# Patient Record
Sex: FEMALE | Race: WHITE | NOT HISPANIC OR LATINO | Employment: OTHER | URBAN - METROPOLITAN AREA
[De-identification: names, ages, dates, MRNs, and addresses within clinical notes are randomized per-mention and may not be internally consistent; named-entity substitution may affect disease eponyms.]

---

## 2023-04-16 DIAGNOSIS — R42 VERTIGO: Primary | ICD-10-CM

## 2023-04-16 RX ORDER — MECLIZINE HCL 12.5 MG 12.5 MG/1
12.5 TABLET ORAL 3 TIMES DAILY PRN
Qty: 30 TABLET | Refills: 11 | Status: SHIPPED | OUTPATIENT
Start: 2023-04-16 | End: 2023-09-03 | Stop reason: ALTCHOICE

## 2023-04-16 NOTE — PROGRESS NOTES
Pt called in after hours over the weekend. Mild vertigo with nausea and vomiting. Requesting a prescription strength medication.    Will call in a supply of antivert. Counselling and warning signs reviewed with patient.

## 2023-05-12 LAB
ALBUMIN (G/DL) IN SER/PLAS: 4.1 G/DL (ref 3.4–5)
ALBUMIN (MG/L) IN URINE: NORMAL
ALBUMIN/CREATININE (UG/MG) IN URINE: NORMAL
ANION GAP IN SER/PLAS: 14 MMOL/L (ref 10–20)
CALCIUM (MG/DL) IN SER/PLAS: 10 MG/DL (ref 8.6–10.6)
CARBON DIOXIDE, TOTAL (MMOL/L) IN SER/PLAS: 29 MMOL/L (ref 21–32)
CHLORIDE (MMOL/L) IN SER/PLAS: 105 MMOL/L (ref 98–107)
CREATININE (MG/DL) IN SER/PLAS: 1.35 MG/DL (ref 0.5–1.05)
CREATININE (MG/DL) IN URINE: NORMAL
CREATININE (MG/DL) IN URINE: NORMAL
ERYTHROCYTE DISTRIBUTION WIDTH (RATIO) BY AUTOMATED COUNT: 14.2 % (ref 11.5–14.5)
ERYTHROCYTE MEAN CORPUSCULAR HEMOGLOBIN CONCENTRATION (G/DL) BY AUTOMATED: 31.8 G/DL (ref 32–36)
ERYTHROCYTE MEAN CORPUSCULAR VOLUME (FL) BY AUTOMATED COUNT: 96 FL (ref 80–100)
ERYTHROCYTES (10*6/UL) IN BLOOD BY AUTOMATED COUNT: 4.25 X10E12/L (ref 4–5.2)
GFR FEMALE: 44 ML/MIN/1.73M2
GLUCOSE (MG/DL) IN SER/PLAS: 176 MG/DL (ref 74–99)
HEMATOCRIT (%) IN BLOOD BY AUTOMATED COUNT: 40.6 % (ref 36–46)
HEMOGLOBIN (G/DL) IN BLOOD: 12.9 G/DL (ref 12–16)
LEUKOCYTES (10*3/UL) IN BLOOD BY AUTOMATED COUNT: 10 X10E9/L (ref 4.4–11.3)
NRBC (PER 100 WBCS) BY AUTOMATED COUNT: 0 /100 WBC (ref 0–0)
PARATHYRIN INTACT (PG/ML) IN SER/PLAS: 72.8 PG/ML (ref 18.5–88)
PHOSPHATE (MG/DL) IN SER/PLAS: 3.1 MG/DL (ref 2.5–4.9)
PLATELETS (10*3/UL) IN BLOOD AUTOMATED COUNT: 175 X10E9/L (ref 150–450)
POTASSIUM (MMOL/L) IN SER/PLAS: 3.5 MMOL/L (ref 3.5–5.3)
PROTEIN (MG/DL) IN URINE: NORMAL
PROTEIN/CREATININE (MG/MG) IN URINE: NORMAL
SODIUM (MMOL/L) IN SER/PLAS: 144 MMOL/L (ref 136–145)
UREA NITROGEN (MG/DL) IN SER/PLAS: 17 MG/DL (ref 6–23)

## 2023-09-03 PROBLEM — K59.00 CONSTIPATION: Status: ACTIVE | Noted: 2023-09-03

## 2023-09-03 PROBLEM — M25.562 BILATERAL KNEE PAIN: Status: ACTIVE | Noted: 2023-09-03

## 2023-09-03 PROBLEM — E10.9 TYPE 1 DIABETES MELLITUS (MULTI): Status: ACTIVE | Noted: 2023-09-03

## 2023-09-03 PROBLEM — I63.9: Status: ACTIVE | Noted: 2023-09-03

## 2023-09-03 PROBLEM — N28.9 ABNORMAL RENAL FUNCTION: Status: ACTIVE | Noted: 2023-09-03

## 2023-09-03 PROBLEM — M54.81 OCCIPITAL NEURALGIA OF RIGHT SIDE: Status: ACTIVE | Noted: 2023-09-03

## 2023-09-03 PROBLEM — N39.0 URINARY TRACT INFECTION: Status: ACTIVE | Noted: 2023-09-03

## 2023-09-03 PROBLEM — G45.9 TIA (TRANSIENT ISCHEMIC ATTACK): Status: ACTIVE | Noted: 2023-09-03

## 2023-09-03 PROBLEM — M54.16 BILATERAL LUMBAR RADICULOPATHY: Status: ACTIVE | Noted: 2023-09-03

## 2023-09-03 PROBLEM — L71.9 ROSACEA, UNSPECIFIED: Status: ACTIVE | Noted: 2022-01-12

## 2023-09-03 PROBLEM — R00.2 PALPITATIONS: Status: ACTIVE | Noted: 2023-09-03

## 2023-09-03 PROBLEM — E78.5 HYPERLIPIDEMIA: Status: ACTIVE | Noted: 2023-09-03

## 2023-09-03 PROBLEM — R06.09 DOE (DYSPNEA ON EXERTION): Status: ACTIVE | Noted: 2023-09-03

## 2023-09-03 PROBLEM — F41.9 ANXIETY: Status: ACTIVE | Noted: 2023-09-03

## 2023-09-03 PROBLEM — F33.0 MAJOR DEPRESSIVE DISORDER, RECURRENT EPISODE, MILD DEGREE (CMS-HCC): Status: ACTIVE | Noted: 2023-09-03

## 2023-09-03 PROBLEM — M25.561 BILATERAL KNEE PAIN: Status: ACTIVE | Noted: 2023-09-03

## 2023-09-03 PROBLEM — R51.9 HEADACHE, CHRONIC DAILY: Status: ACTIVE | Noted: 2023-09-03

## 2023-09-03 PROBLEM — R60.9 EDEMA: Status: ACTIVE | Noted: 2023-09-03

## 2023-09-03 PROBLEM — Z94.0 KIDNEY REPLACED BY TRANSPLANT (HHS-HCC): Status: ACTIVE | Noted: 2023-09-03

## 2023-09-03 PROBLEM — E55.9 VITAMIN D DEFICIENCY: Status: ACTIVE | Noted: 2023-09-03

## 2023-09-03 PROBLEM — G43.911 INTRACTABLE MIGRAINE WITH STATUS MIGRAINOSUS: Status: ACTIVE | Noted: 2023-09-03

## 2023-09-03 PROBLEM — M16.0 PRIMARY LOCALIZED OSTEOARTHRITIS OF BOTH HIPS: Status: ACTIVE | Noted: 2023-09-03

## 2023-09-03 PROBLEM — E87.6 HYPOKALEMIA: Status: ACTIVE | Noted: 2023-09-03

## 2023-09-03 PROBLEM — I10 HYPERTENSION: Status: ACTIVE | Noted: 2023-09-03

## 2023-09-03 PROBLEM — E13.44: Status: ACTIVE | Noted: 2023-09-03

## 2023-09-03 RX ORDER — INSULIN LISPRO 100 [IU]/ML
10 INJECTION, SOLUTION INTRAVENOUS; SUBCUTANEOUS
COMMUNITY
Start: 2023-08-31

## 2023-09-03 RX ORDER — OMEPRAZOLE 20 MG/1
20 CAPSULE, DELAYED RELEASE ORAL
COMMUNITY
End: 2023-09-07 | Stop reason: ALTCHOICE

## 2023-09-03 RX ORDER — PROPRANOLOL HYDROCHLORIDE 20 MG/1
20 TABLET ORAL DAILY
COMMUNITY

## 2023-09-03 RX ORDER — BUPROPION HYDROCHLORIDE 300 MG/1
1 TABLET ORAL DAILY
COMMUNITY
Start: 2020-06-09 | End: 2024-01-02

## 2023-09-03 RX ORDER — POLYETHYLENE GLYCOL 3350 17 G/17G
17 POWDER, FOR SOLUTION ORAL
COMMUNITY

## 2023-09-03 RX ORDER — BUPROPION HYDROCHLORIDE 150 MG/1
150 TABLET ORAL
COMMUNITY
Start: 2022-11-14

## 2023-09-03 RX ORDER — PANTOPRAZOLE SODIUM 40 MG/1
40 TABLET, DELAYED RELEASE ORAL DAILY
COMMUNITY
Start: 2015-06-01

## 2023-09-03 RX ORDER — FUROSEMIDE 40 MG/1
1 TABLET ORAL DAILY
COMMUNITY
Start: 2022-02-14

## 2023-09-03 RX ORDER — ASPIRIN 81 MG/1
81 TABLET ORAL 2 TIMES DAILY
COMMUNITY
Start: 2016-08-15

## 2023-09-03 RX ORDER — ADHESIVE BANDAGE
30 BANDAGE TOPICAL
COMMUNITY
End: 2023-09-07 | Stop reason: ALTCHOICE

## 2023-09-03 RX ORDER — AMOXICILLIN 250 MG
2 CAPSULE ORAL 2 TIMES DAILY
COMMUNITY
Start: 2022-01-31

## 2023-09-03 RX ORDER — CLONAZEPAM 1 MG/1
1 TABLET ORAL DAILY
COMMUNITY

## 2023-09-03 RX ORDER — CALCIUM CARBONATE/VITAMIN D3 600MG-5MCG
1 TABLET ORAL DAILY
COMMUNITY
Start: 2015-06-01 | End: 2023-09-07 | Stop reason: ALTCHOICE

## 2023-09-03 RX ORDER — ROSUVASTATIN CALCIUM 20 MG/1
20 TABLET, COATED ORAL DAILY
COMMUNITY
Start: 2023-07-07

## 2023-09-03 RX ORDER — ELECTROLYTES/DEXTROSE
1 SOLUTION, ORAL ORAL DAILY
COMMUNITY
Start: 2015-06-01

## 2023-09-03 RX ORDER — GABAPENTIN 100 MG/1
200 CAPSULE ORAL 2 TIMES DAILY PRN
COMMUNITY

## 2023-09-03 RX ORDER — POTASSIUM CHLORIDE 1500 MG/1
10 TABLET, EXTENDED RELEASE ORAL DAILY
COMMUNITY
Start: 2016-03-17

## 2023-09-03 RX ORDER — SERTRALINE HYDROCHLORIDE 100 MG/1
2 TABLET, FILM COATED ORAL DAILY
COMMUNITY
Start: 2020-10-27 | End: 2024-03-09

## 2023-09-03 RX ORDER — BUTYROSPERMUM PARKII(SHEA BUTTER), SIMMONDSIA CHINENSIS (JOJOBA) SEED OIL, ALOE BARBADENSIS LEAF EXTRACT .01; 1; 3.5 G/100G; G/100G; G/100G
250 LIQUID TOPICAL 2 TIMES DAILY
COMMUNITY

## 2023-09-03 RX ORDER — LOSARTAN POTASSIUM 25 MG/1
0.5 TABLET ORAL DAILY
COMMUNITY
Start: 2022-09-26 | End: 2023-09-07 | Stop reason: ALTCHOICE

## 2023-09-03 RX ORDER — L. ACIDOPHILUS/PECTIN, CITRUS 25MM-100MG
1 TABLET ORAL DAILY
COMMUNITY

## 2023-09-03 RX ORDER — INSULIN GLARGINE 300 U/ML
20 INJECTION, SOLUTION SUBCUTANEOUS NIGHTLY
COMMUNITY
Start: 2022-05-24

## 2023-09-03 RX ORDER — ATORVASTATIN CALCIUM 40 MG/1
40 TABLET, FILM COATED ORAL DAILY
COMMUNITY
End: 2023-09-07 | Stop reason: ALTCHOICE

## 2023-09-04 NOTE — PROGRESS NOTES
Subjective   Patient ID: Yin Ojeda is a 63 y.o. female who presents for follow-up regarding diabetes    HPI     Review of Systems    Objective   There were no vitals taken for this visit.    Physical Exam    Assessment/Plan   {Assess/PlanSmartLinks:48623}

## 2023-09-06 ENCOUNTER — APPOINTMENT (OUTPATIENT)
Dept: PRIMARY CARE | Facility: CLINIC | Age: 63
End: 2023-09-06
Payer: MEDICARE

## 2023-09-06 NOTE — PROGRESS NOTES
Subjective   Patient ID: Yin Ojeda is a 63 y.o. female who presents for diabetes follow-up    HPI   The patient reports a history of intermittent episodes of a needle sensation over the plantar surfaces of both heels over the past several months.  She reports no precipitating, exacerbating, relieving factors.  She reports no associated weakness in the feet.  No other associated symptoms.  She is concerned that she may have skin breakdown or ulcerations in both heels.  Review of Systems    Objective   There were no vitals taken for this visit.    Physical Exam  Skin-the plantar surfaces are both erythematous but not swollen.  No lesions noted.  Pulses-lower extremities-0 bilaterally  Neurologic  Lower extremities:  Motor-strength dorsi flexors of both ankles 5/5, plantar flexors of both ankles 4/5.  Strength 5/5 in all other muscle groups tested  Sensory  Light touch sensation fully intact  Pinprick sensation not present in the forefoot regions bilaterally  Vibratory sensation diminished in the lower extremities bilaterally equally  Assessment/Plan        Assessment  Intermittent episodes of a needle sensation over the plantar surfaces of both heels-unsure of etiology.  May be secondary to diabetic neuropathy, nerve compression?  Plan  I told the patient to apply Voltaren gel to both regions every 6 hours as needed.  She will continue all of her other current medications.  The patient will call me in 1 month with her condition

## 2023-09-07 ENCOUNTER — OFFICE VISIT (OUTPATIENT)
Dept: PRIMARY CARE | Facility: CLINIC | Age: 63
End: 2023-09-07
Payer: MEDICARE

## 2023-09-07 VITALS
SYSTOLIC BLOOD PRESSURE: 110 MMHG | HEART RATE: 74 BPM | DIASTOLIC BLOOD PRESSURE: 68 MMHG | WEIGHT: 110.2 LBS | BODY MASS INDEX: 20.16 KG/M2

## 2023-09-07 DIAGNOSIS — I10 PRIMARY HYPERTENSION: ICD-10-CM

## 2023-09-07 DIAGNOSIS — R06.09 DOE (DYSPNEA ON EXERTION): Primary | ICD-10-CM

## 2023-09-07 DIAGNOSIS — N28.9 ABNORMAL RENAL FUNCTION: ICD-10-CM

## 2023-09-07 DIAGNOSIS — E10.42 TYPE 1 DIABETES MELLITUS WITH DIABETIC POLYNEUROPATHY (MULTI): ICD-10-CM

## 2023-09-07 PROCEDURE — 3074F SYST BP LT 130 MM HG: CPT | Performed by: INTERNAL MEDICINE

## 2023-09-07 PROCEDURE — 3078F DIAST BP <80 MM HG: CPT | Performed by: INTERNAL MEDICINE

## 2023-09-07 PROCEDURE — 99213 OFFICE O/P EST LOW 20 MIN: CPT | Performed by: INTERNAL MEDICINE

## 2023-09-28 LAB
ALBUMIN (G/DL) IN SER/PLAS: 4.2 G/DL (ref 3.4–5)
ALBUMIN (MG/L) IN URINE: 342.3 MG/L
ALBUMIN/CREATININE (UG/MG) IN URINE: 447.5 UG/MG CRT (ref 0–30)
ANION GAP IN SER/PLAS: 15 MMOL/L (ref 10–20)
CALCIUM (MG/DL) IN SER/PLAS: 10.1 MG/DL (ref 8.6–10.6)
CARBON DIOXIDE, TOTAL (MMOL/L) IN SER/PLAS: 24 MMOL/L (ref 21–32)
CHLORIDE (MMOL/L) IN SER/PLAS: 105 MMOL/L (ref 98–107)
CREATININE (MG/DL) IN SER/PLAS: 0.94 MG/DL (ref 0.5–1.05)
CREATININE (MG/DL) IN URINE: 76.5 MG/DL (ref 20–320)
CREATININE (MG/DL) IN URINE: 76.5 MG/DL (ref 20–320)
ERYTHROCYTE DISTRIBUTION WIDTH (RATIO) BY AUTOMATED COUNT: 14.1 % (ref 11.5–14.5)
ERYTHROCYTE MEAN CORPUSCULAR HEMOGLOBIN CONCENTRATION (G/DL) BY AUTOMATED: 32.1 G/DL (ref 32–36)
ERYTHROCYTE MEAN CORPUSCULAR VOLUME (FL) BY AUTOMATED COUNT: 98 FL (ref 80–100)
ERYTHROCYTES (10*6/UL) IN BLOOD BY AUTOMATED COUNT: 4.25 X10E12/L (ref 4–5.2)
GFR FEMALE: 68 ML/MIN/1.73M2
GLUCOSE (MG/DL) IN SER/PLAS: 279 MG/DL (ref 74–99)
HEMATOCRIT (%) IN BLOOD BY AUTOMATED COUNT: 41.8 % (ref 36–46)
HEMOGLOBIN (G/DL) IN BLOOD: 13.4 G/DL (ref 12–16)
LEUKOCYTES (10*3/UL) IN BLOOD BY AUTOMATED COUNT: 9.8 X10E9/L (ref 4.4–11.3)
NRBC (PER 100 WBCS) BY AUTOMATED COUNT: 0 /100 WBC (ref 0–0)
PHOSPHATE (MG/DL) IN SER/PLAS: 3.7 MG/DL (ref 2.5–4.9)
PLATELETS (10*3/UL) IN BLOOD AUTOMATED COUNT: 152 X10E9/L (ref 150–450)
POTASSIUM (MMOL/L) IN SER/PLAS: 4.8 MMOL/L (ref 3.5–5.3)
PROTEIN (MG/DL) IN URINE: 94 MG/DL (ref 5–24)
PROTEIN/CREATININE (MG/MG) IN URINE: 1.23 MG/MG CREAT (ref 0–0.17)
SODIUM (MMOL/L) IN SER/PLAS: 139 MMOL/L (ref 136–145)
UREA NITROGEN (MG/DL) IN SER/PLAS: 17 MG/DL (ref 6–23)

## 2023-09-29 LAB — PARATHYRIN INTACT (PG/ML) IN SER/PLAS: 41.7 PG/ML (ref 18.5–88)

## 2023-10-13 ENCOUNTER — TELEPHONE (OUTPATIENT)
Dept: PRIMARY CARE | Facility: CLINIC | Age: 63
End: 2023-10-13

## 2023-10-13 ENCOUNTER — LAB (OUTPATIENT)
Dept: LAB | Facility: LAB | Age: 63
End: 2023-10-13
Payer: MEDICARE

## 2023-10-13 DIAGNOSIS — N30.00 ACUTE CYSTITIS WITHOUT HEMATURIA: Primary | ICD-10-CM

## 2023-10-13 DIAGNOSIS — N30.00 ACUTE CYSTITIS WITHOUT HEMATURIA: ICD-10-CM

## 2023-10-13 LAB — HOLD SPECIMEN: NORMAL

## 2023-10-13 PROCEDURE — 87086 URINE CULTURE/COLONY COUNT: CPT

## 2023-10-13 PROCEDURE — 81001 URINALYSIS AUTO W/SCOPE: CPT

## 2023-10-13 RX ORDER — CEFUROXIME AXETIL 250 MG/1
250 TABLET ORAL 2 TIMES DAILY
Qty: 14 TABLET | Refills: 0 | Status: SHIPPED | OUTPATIENT
Start: 2023-10-13 | End: 2023-10-20

## 2023-10-13 RX ORDER — CEFUROXIME AXETIL 250 MG/1
250 TABLET ORAL 2 TIMES DAILY
Qty: 14 TABLET | Refills: 0 | Status: SHIPPED | OUTPATIENT
Start: 2023-10-13 | End: 2023-10-13 | Stop reason: SDUPTHER

## 2023-10-13 NOTE — TELEPHONE ENCOUNTER
Patient is stating she has a UTI and want to see if you can call a Rx in for her. Please give her a call back [phone]

## 2023-10-14 LAB
APPEARANCE UR: ABNORMAL
BILIRUB UR STRIP.AUTO-MCNC: NEGATIVE MG/DL
COLOR UR: YELLOW
GLUCOSE UR STRIP.AUTO-MCNC: ABNORMAL MG/DL
KETONES UR STRIP.AUTO-MCNC: NEGATIVE MG/DL
LEUKOCYTE ESTERASE UR QL STRIP.AUTO: ABNORMAL
MUCOUS THREADS #/AREA URNS AUTO: ABNORMAL /LPF
NITRITE UR QL STRIP.AUTO: NEGATIVE
PH UR STRIP.AUTO: 6 [PH]
PROT UR STRIP.AUTO-MCNC: ABNORMAL MG/DL
RBC # UR STRIP.AUTO: NEGATIVE /UL
RBC #/AREA URNS AUTO: ABNORMAL /HPF
SP GR UR STRIP.AUTO: 1.02
SQUAMOUS #/AREA URNS AUTO: ABNORMAL /HPF
UROBILINOGEN UR STRIP.AUTO-MCNC: <2 MG/DL
WBC #/AREA URNS AUTO: ABNORMAL /HPF
YEAST BUDDING #/AREA UR COMP ASSIST: PRESENT /HPF

## 2023-10-18 LAB — BACTERIA UR CULT: ABNORMAL

## 2023-10-19 ENCOUNTER — TELEPHONE (OUTPATIENT)
Dept: TRANSPLANT | Facility: HOSPITAL | Age: 63
End: 2023-10-19
Payer: MEDICARE

## 2023-10-31 DIAGNOSIS — I10 PRIMARY HYPERTENSION: Primary | ICD-10-CM

## 2023-11-02 RX ORDER — PROPRANOLOL HYDROCHLORIDE 10 MG/1
20 TABLET ORAL DAILY
Qty: 180 TABLET | Refills: 2 | Status: SHIPPED | OUTPATIENT
Start: 2023-11-02 | End: 2024-04-01 | Stop reason: DRUGHIGH

## 2023-12-29 DIAGNOSIS — F33.0 MAJOR DEPRESSIVE DISORDER, RECURRENT EPISODE, MILD DEGREE (CMS-HCC): Primary | ICD-10-CM

## 2024-01-02 RX ORDER — TRAZODONE HYDROCHLORIDE 150 MG/1
300 TABLET ORAL NIGHTLY
Qty: 180 TABLET | Refills: 3 | Status: SHIPPED | OUTPATIENT
Start: 2024-01-02 | End: 2024-03-09

## 2024-01-02 RX ORDER — BUPROPION HYDROCHLORIDE 300 MG/1
300 TABLET ORAL DAILY
Qty: 90 TABLET | Refills: 3 | Status: SHIPPED | OUTPATIENT
Start: 2024-01-02 | End: 2024-03-09

## 2024-01-15 ENCOUNTER — LAB (OUTPATIENT)
Dept: LAB | Facility: LAB | Age: 64
End: 2024-01-15
Payer: MEDICARE

## 2024-01-15 DIAGNOSIS — Z94.0 KIDNEY TRANSPLANT STATUS (HHS-HCC): Primary | ICD-10-CM

## 2024-01-15 DIAGNOSIS — F41.9 ANXIETY DISORDER, UNSPECIFIED: ICD-10-CM

## 2024-01-15 LAB
ALBUMIN SERPL BCP-MCNC: 4 G/DL (ref 3.4–5)
ANION GAP SERPL CALC-SCNC: 12 MMOL/L (ref 10–20)
BUN SERPL-MCNC: 16 MG/DL (ref 6–23)
CALCIUM SERPL-MCNC: 10 MG/DL (ref 8.6–10.6)
CHLORIDE SERPL-SCNC: 107 MMOL/L (ref 98–107)
CO2 SERPL-SCNC: 27 MMOL/L (ref 21–32)
CREAT SERPL-MCNC: 1.02 MG/DL (ref 0.5–1.05)
EGFRCR SERPLBLD CKD-EPI 2021: 62 ML/MIN/1.73M*2
ERYTHROCYTE [DISTWIDTH] IN BLOOD BY AUTOMATED COUNT: 13.5 % (ref 11.5–14.5)
GLUCOSE SERPL-MCNC: 149 MG/DL (ref 74–99)
HCT VFR BLD AUTO: 41 % (ref 36–46)
HGB BLD-MCNC: 13 G/DL (ref 12–16)
MCH RBC QN AUTO: 30.2 PG (ref 26–34)
MCHC RBC AUTO-ENTMCNC: 31.7 G/DL (ref 32–36)
MCV RBC AUTO: 95 FL (ref 80–100)
NRBC BLD-RTO: 0 /100 WBCS (ref 0–0)
PHOSPHATE SERPL-MCNC: 3.6 MG/DL (ref 2.5–4.9)
PLATELET # BLD AUTO: 144 X10*3/UL (ref 150–450)
POTASSIUM SERPL-SCNC: 4.3 MMOL/L (ref 3.5–5.3)
PTH-INTACT SERPL-MCNC: 50.1 PG/ML (ref 18.5–88)
RBC # BLD AUTO: 4.3 X10*6/UL (ref 4–5.2)
SODIUM SERPL-SCNC: 142 MMOL/L (ref 136–145)
WBC # BLD AUTO: 9.9 X10*3/UL (ref 4.4–11.3)

## 2024-01-15 PROCEDURE — 84156 ASSAY OF PROTEIN URINE: CPT

## 2024-01-15 PROCEDURE — 85027 COMPLETE CBC AUTOMATED: CPT

## 2024-01-15 PROCEDURE — 83970 ASSAY OF PARATHORMONE: CPT

## 2024-01-15 PROCEDURE — 82043 UR ALBUMIN QUANTITATIVE: CPT

## 2024-01-15 PROCEDURE — 36415 COLL VENOUS BLD VENIPUNCTURE: CPT

## 2024-01-15 PROCEDURE — 82570 ASSAY OF URINE CREATININE: CPT

## 2024-01-15 PROCEDURE — 80069 RENAL FUNCTION PANEL: CPT

## 2024-01-16 LAB
CREAT UR-MCNC: 80.1 MG/DL (ref 20–320)
CREAT UR-MCNC: 80.1 MG/DL (ref 20–320)
MICROALBUMIN UR-MCNC: 261.3 MG/L
MICROALBUMIN/CREAT UR: 326.2 UG/MG CREAT
PROT UR-ACNC: 74 MG/DL (ref 5–24)
PROT/CREAT UR: 0.92 MG/MG CREAT (ref 0–0.17)

## 2024-03-08 DIAGNOSIS — F33.0 MAJOR DEPRESSIVE DISORDER, RECURRENT EPISODE, MILD DEGREE (CMS-HCC): ICD-10-CM

## 2024-03-09 DIAGNOSIS — F33.0 MAJOR DEPRESSIVE DISORDER, RECURRENT EPISODE, MILD DEGREE (CMS-HCC): ICD-10-CM

## 2024-03-09 RX ORDER — SERTRALINE HYDROCHLORIDE 100 MG/1
TABLET, FILM COATED ORAL
Qty: 180 TABLET | Refills: 0 | Status: SHIPPED | OUTPATIENT
Start: 2024-03-09 | End: 2024-03-11

## 2024-03-09 RX ORDER — BUPROPION HYDROCHLORIDE 300 MG/1
300 TABLET ORAL EVERY MORNING
Qty: 90 TABLET | Refills: 3 | Status: SHIPPED | OUTPATIENT
Start: 2024-03-09

## 2024-03-09 RX ORDER — TRAZODONE HYDROCHLORIDE 150 MG/1
TABLET ORAL
Qty: 180 TABLET | Refills: 0 | Status: SHIPPED | OUTPATIENT
Start: 2024-03-09 | End: 2024-05-31

## 2024-03-11 RX ORDER — SERTRALINE HYDROCHLORIDE 100 MG/1
100 TABLET, FILM COATED ORAL DAILY
Qty: 90 TABLET | Refills: 3 | Status: SHIPPED | OUTPATIENT
Start: 2024-03-11 | End: 2024-05-28 | Stop reason: SDUPTHER

## 2024-03-21 ENCOUNTER — APPOINTMENT (OUTPATIENT)
Dept: NEPHROLOGY | Facility: CLINIC | Age: 64
End: 2024-03-21
Payer: MEDICARE

## 2024-04-01 ENCOUNTER — TELEMEDICINE (OUTPATIENT)
Dept: NEPHROLOGY | Facility: CLINIC | Age: 64
End: 2024-04-01
Payer: MEDICARE

## 2024-04-01 VITALS
BODY MASS INDEX: 19.88 KG/M2 | SYSTOLIC BLOOD PRESSURE: 135 MMHG | WEIGHT: 108 LBS | HEIGHT: 62 IN | DIASTOLIC BLOOD PRESSURE: 65 MMHG | HEART RATE: 62 BPM

## 2024-04-01 DIAGNOSIS — Z94.0 KIDNEY REPLACED BY TRANSPLANT (HHS-HCC): ICD-10-CM

## 2024-04-01 DIAGNOSIS — N28.9 ABNORMAL RENAL FUNCTION: Primary | ICD-10-CM

## 2024-04-01 DIAGNOSIS — T86.19 OTHER COMPLICATION OF KIDNEY TRANSPLANT (HHS-HCC): ICD-10-CM

## 2024-04-01 DIAGNOSIS — E10.10 TYPE 1 DIABETES MELLITUS WITH KETOACIDOSIS WITHOUT COMA (MULTI): ICD-10-CM

## 2024-04-01 PROCEDURE — 1159F MED LIST DOCD IN RCRD: CPT | Performed by: INTERNAL MEDICINE

## 2024-04-01 PROCEDURE — 3075F SYST BP GE 130 - 139MM HG: CPT | Performed by: INTERNAL MEDICINE

## 2024-04-01 PROCEDURE — 3078F DIAST BP <80 MM HG: CPT | Performed by: INTERNAL MEDICINE

## 2024-04-01 PROCEDURE — 99214 OFFICE O/P EST MOD 30 MIN: CPT | Performed by: INTERNAL MEDICINE

## 2024-04-01 PROCEDURE — 3060F POS MICROALBUMINURIA REV: CPT | Performed by: INTERNAL MEDICINE

## 2024-04-01 NOTE — PROGRESS NOTES
Subjective       Yin Ojeda is a 64 y.o. 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    Last office visit September 2023.  Yin had no kidney related complaints or concerns today.  She successfully weaned down on her Lasix to 40 mg every few days with no significant leg swelling.  Graft function improved significantly now serum creatinine is down to 1 and GFR up to 62.  She continues to be on potassium supplements.  Blood pressure is accepted.  She has some high side problems-she continues to see the eye doctors.    Per prior notes  Patient has no new complaints today, she states that she has not taken the furosemide today. Examination was not consistent with edema or fluid retention. Vitals were all within normal limits, with BP of 148/67 (BP at home is in the range of 120-130) and heart rate of 63. Labs reviewed, hemoglobin is 12.9. Creatinine is 1.35 and EGFR is 44. No concern for metabolic bone disease, vitamin D and PTH within normal limits. Medications include Lasix 80, rosuvastatin, propranolol.           Per prior notes     Last seen September 2022. At last visit she was continued on lasix 80 mg twice dialy, . States she is no longer taking the losartan. Cr 1.55 and GFR 37 which is worsening from baseline of GFr 1.2-1.3, GFR 50. Blood pressure in office today is at goal. Her K is 5.3. States she is on calcium supplements and still is hypercalcemic with Ca 11.3. Recently started Prolia shots.         Per Prior note,   Yin came today with her identical twin (the kidney donor). She feels in her baseline state of health. She did not do well after we tried to decrease Lasix. Currently she is on 160 mg Lasix daily (80 mg twice daily). Every time attempt to decrease diuretic dose she does not feel good as she gets the sense  "of leg swelling. She reports she has been following with endocrinology and she was on Prolia for severe osteoporosis. Recently, after her last fall she stopped Prolia as she thought it does not work. She had blood work done every 3 months as instructed. Graft function is stable 50%. Hypercalcemia noticed. Blood pressures elevated in office today but reports good control at home 118/125 over 70s-infrequent checks. No complaints or concerns today. She reports occasional use of Advil for generalized abdominal pain     Her prior notes     Yin came today with her older sister. She reports to be in her baseline state of health. She had recent hospital admission in February 2022 at [facility] after she broke her hip. She had UTI/pyelonephritis/KADEEM serum creatinine went up from her baseline less than 1-1.8. UTI was treated with ceftriaxone. KADEEM resolved. Patient has been taking Lasix 200 mg daily for leg swelling. She is wondering if she can go up on Lasix. Today do not recommend going up on Lasix based on her exam. She denies lower urinary tract symptoms. Blood pressure is in target. She is adherent to lab work every 3 months. No significant NSAID use at home     Family history: Identical twin with no history of type 1 diabetes  Social history: Multiple fractures and hip surgeries       Objective   /65   Pulse 62   Ht 1.575 m (5' 2\")   Wt 49 kg (108 lb)   BMI 19.75 kg/m²   Wt Readings from Last 3 Encounters:   04/01/24 49 kg (108 lb)   09/20/23 50.9 kg (112 lb 4 oz)   09/07/23 50 kg (110 lb 3.2 oz)       Physical Exam    Virtual visit  Awake and alert-no significant hypervolemia exam.  On room air.  No skin rash       Review of Systems     Constitutional: no fever, no chills, no recent weight gain and no recent weight loss.   Eyes: no blurred vision and no diplopia.   ENT: no hearing loss, no earache, no sore throat, no swollen glands in the neck and no nasal discharge.   Cardiovascular: no chest " "pain, no palpitations and no lower extremity edema.   Respiratory: no shortness of breath, no chronic cough and no shortness of breath during exertion.   Gastrointestinal: no abdominal pain, no constipation, no heartburn, no vomiting, no bloody stools and no change in bowel movements.   Genitourinary: no dysuria and no hematuria.   Musculoskeletal: no arthralgias and no myalgias.   Skin: no rashes and no skin lesions.   Neurological: no headaches and no dizziness.   Psychiatric: no confusion, no depression and no anxiety.   Endocrine: no heat intolerance, no cold intolerance, appetite not increased, no thyroid disorder, no increased urinary frequency and no dry skin.   Hematologic/Lymphatic: does not bleed easily and does not bruise easily.   All other systems have been reviewed and are negative for complaint.         Data Review                   No results found for: \"URICACID\"        Lab Results   Component Value Date    HGBA1C 8.0 (A) 07/24/2023                 No lab exists for component: \"CR\", \"PHOSPHORUS\"        Albumin/Creatine Ratio   Date Value Ref Range Status   01/15/2024 326.2 (H) <30.0 ug/mg Creat Final   09/28/2023 447.5 (H) 0.0 - 30.0 ug/mg crt Final   05/12/2023 CANCELED       Comment:     Result canceled by the ancillary.            RFP  Recent Labs     01/15/24  1200 09/28/23  1248 05/12/23  1531 01/20/23  1037 09/01/22  1017 06/14/22  1419 05/26/22  0920    139 144 140 139 139 140   K 4.3 4.8 3.5 5.3 5.0 4.4 4.4    105 105 98 100 99 99   CO2 27 24 29 31 29 32 30   BUN 16 17 17 24* 14 16 21   CREATININE 1.02 0.94 1.35* 1.55* 1.22* 1.32* 1.38*   GLUCOSE 149* 279* 176* 338* 240* 217* 316*   CALCIUM 10.0 10.1 10.0 11.3* 10.5* 9.8 10.4*   PHOS 3.6 3.7 3.1 5.3* 4.2 4.5 4.9   EGFR 62  --   --   --   --   --   --    ANIONGAP 12 15 14 16 15 12 15        Urineanalysis  Recent Labs     10/13/23  1409 09/28/23  1248 05/12/23  1531 01/20/23  1037 05/26/22  0920 03/24/22  1400 02/03/22  1824 " 01/31/22  0141 12/22/20  1521 02/12/20  0942 11/01/19  1420   COLORU Yellow  --   --  YELLOW YELLOW STRAW YELLOW YELLOW  --  YELLOW YELLOW   APPEARANCEU Hazy*  --   --  CLEAR HAZY CLEAR HAZY CLEAR  --  HAZY CLEAR   SPECGRAVU 1.017  --   --  1.010 1.008 1.006 1.009 1.013  --  1.010 1.010   JAMAR 6.0  --   --  6.0 6.0 6.0 6.0 6.0  --  6.0 6.0   PROTUR 100 (2+)* 94* CANCELED NEGATIVE NEGATIVE NEGATIVE 100(2+)* NEGATIVE 17 NEGATIVE NEGATIVE   GLUCOSEU 50 (1+)*  --   --  NEGATIVE NEGATIVE 50(1+)* 50(1+)* NEGATIVE  --  50 (TRACE)* NEGATIVE   BLOODU NEGATIVE  --   --  NEGATIVE SMALL(1+)* SMALL(1+)* MODERATE(2+)* NEGATIVE  --  NEGATIVE NEGATIVE   KETONESU NEGATIVE  --   --  NEGATIVE NEGATIVE NEGATIVE NEGATIVE NEGATIVE  --  NEGATIVE NEGATIVE   BILIRUBINU NEGATIVE  --   --  NEGATIVE NEGATIVE NEGATIVE NEGATIVE NEGATIVE  --  NEGATIVE NEGATIVE   NITRITEU NEGATIVE  --   --  NEGATIVE NEGATIVE NEGATIVE NEGATIVE NEGATIVE  --  NEGATIVE NEGATIVE   LEUKOCYTESU SMALL (1+)*  --   --  TRACE* LARGE(3+)* MODERATE(2+)* LARGE(3+)* NEGATIVE  --  NEGATIVE NEGATIVE       Urine Electrolytes  Recent Labs     01/15/24  1200 10/13/23  1409 09/28/23  1248 05/12/23  1531 01/20/23  1037 05/26/22  0920 03/24/22  1400 02/03/22  1824 01/31/22  0141 12/22/20  1521 02/12/20  0942 11/01/19  1420   CREATU 80.1  80.1  --  76.5  76.5 CANCELED  CANCELED 60.5 52.9  --   --   --  54.3  --   --    PROTUR  --  100 (2+)* 94* CANCELED NEGATIVE NEGATIVE NEGATIVE 100(2+)* NEGATIVE 17 NEGATIVE NEGATIVE   UTPCR 0.92*  --  1.23* CANCELED  --   --   --   --   --  0.31*  --   --    ALBUMINUR 261.3  --   --   --   --   --   --   --   --   --   --   --    MICROALBCREA 326.2*  --  447.5* CANCELED 96.9* 50.9*  --   --   --   --   --   --         Urine Micro  Recent Labs     10/13/23  1409 01/20/23  1037 05/26/22  0920 03/24/22  1400 02/03/22  1824   WBCU 6-10* 3 64* 44* >182*   RBCU 1-2 <1 1 1 122*   HYALCASTU  --  4+* 1+* 3+*  --    SQUAMEPIU 1-9 (SPARSE) 1 1 1  --   "  BACTERIAU  --  1+*  --  1+* 1+*   MUCUSU 1+ FEW FEW FEW FEW        Iron  No results for input(s): \"IRON\", \"TIBC\", \"IRONSAT\", \"FERRITIN\" in the last [id number] hours.       Current Outpatient Medications on File Prior to Visit   Medication Sig Dispense Refill    aspirin 81 mg EC tablet Take 1 tablet (81 mg) by mouth 2 times a day.      biotin 5 mg capsule Take 1 tablet by mouth once daily.      buPROPion XL (Wellbutrin XL) 300 mg 24 hr tablet Take 1 tablet (300 mg) by mouth once daily in the morning. 90 tablet 3    clonazePAM (KlonoPIN) 1 mg tablet Take 1 tablet (1 mg) by mouth once daily.      furosemide (Lasix) 40 mg tablet Take 1 tablet (40 mg) by mouth once daily. As needed for swelling      insulin lispro (HumaLOG) 100 unit/mL injection Inject 10 Units under the skin 3 times a day with meals.      Klor-Con M20 20 mEq ER tablet Take 0.5 tablets (10 mEq) by mouth once daily.      pantoprazole (ProtoNix) 40 mg EC tablet Take 1 tablet (40 mg) by mouth once daily.      propranolol (Inderal) 20 mg tablet Take 1 tablet (20 mg) by mouth once daily.      rosuvastatin (Crestor) 20 mg tablet Take 1 tablet (20 mg) by mouth once daily.      sertraline (Zoloft) 100 mg tablet Take 1 tablet (100 mg) by mouth once daily. 90 tablet 3    Toujeo SoloStar U-300 Insulin 300 unit/mL (1.5 mL) injection Inject 20 Units under the skin once daily at bedtime.      traZODone (Desyrel) 150 mg tablet To be filled by Provider (Patient taking differently: Take 2 tablets (300 mg) by mouth once daily at bedtime.) 180 tablet 0    buPROPion XL (Wellbutrin XL) 150 mg 24 hr tablet Take 1 tablet (150 mg) by mouth once daily in the morning. Take before meals.      gabapentin (Neurontin) 100 mg capsule Take 2 capsules (200 mg) by mouth 2 times a day as needed.      lactobacillus acidophilus (acidophilus-pectin, citrus) tablet tablet Take 1 tablet by mouth once daily.      polyethylene glycol (Glycolax, Miralax) 17 gram/dose powder Take 17 g by mouth.      " saccharomyces boulardii (Florastor) 250 mg capsule Take 1 capsule (250 mg) by mouth 2 times a day.      sennosides-docusate sodium (Rose-Colace) 8.6-50 mg tablet Take 2 tablets by mouth twice a day.      [DISCONTINUED] propranolol (Inderal) 10 mg tablet TAKE 2 TABLETS DAILY (Patient not taking: Reported on 4/1/2024) 180 tablet 2     No current facility-administered medications on file prior to visit.           Assessment and Plan       Yin Ojeda  is a 64 y.o. 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  Suspected graft dysfunction might be due of lasix. I discussed with patient several times that she does not need that much of diuretics based on her exam. She takes it for leg swelling.  She successfully drop Lasix to 40 mg as needed-now serum creatinine improved to GFR improved  Blood Pressure Control: and adequate on Lasix 40 mg as needed. Was started on losartan for hypertension and graft GFR preservation but she is not currently on it. Not a candidate for SGL 2 inhibitor due to increased risk of ketoacidosis with type 1 diabetes. Patient agrees to decrease the dose of lasix to PRN for pedal edema   - K was borderline elevated now normal at 3.5. Continue Klor-Con to 1/2 tabled twice  a day.   - Continue holding Calcium and Vitamin D supplements   -Continue Prolia, will consider restarting Ca in the future is levels fall and she remains getting Prolia  shots. PTH is normal    Follow-up in 6 months with repeat blood work    Zack Hernandez MD, MS, QASIM MARQUEZ   Clinical  - [facility] [employer]   Nephrologist - [facility]

## 2024-04-01 NOTE — PATIENT INSTRUCTIONS
Dear NIGEL   It was nice seeing you in the nephrology clinic today     Today we discussed the following:  #Kidney transplant from identical twin in 1998 not on immunosuppression medication. Graft function is is improving up to 62% after adjusting Lasix dose. Discussed appropriate hydration today.  Will change blood work to every 6 months. Follow-up in office every 6 months     #Hypertension-blood pressure is accepted.  Continue Lasix 40 mg as needed.  Continue potassium supplements as you are doing    #Elevated calcium now normal-continue to hold calcium supplements. I recommend continuing Prolia with endocrinology.     #High potassium-now normal-continue half pill twice a day instead of 1 full pill twice a day     Office visit every 6 months. There is recurring orders from us every 6 months under my name        Please call our office if you have any question  Thank you for coming to see me today     Zack Hernandez MD, MS, QASIM MARQUEZ  Clinical  - [facility] [employer]  Nephrologist - [facility]

## 2024-05-28 ENCOUNTER — TELEPHONE (OUTPATIENT)
Dept: PRIMARY CARE | Facility: CLINIC | Age: 64
End: 2024-05-28
Payer: MEDICARE

## 2024-05-28 DIAGNOSIS — F33.0 MAJOR DEPRESSIVE DISORDER, RECURRENT EPISODE, MILD DEGREE (CMS-HCC): ICD-10-CM

## 2024-05-28 RX ORDER — SERTRALINE HYDROCHLORIDE 100 MG/1
100 TABLET, FILM COATED ORAL DAILY
Qty: 90 TABLET | Refills: 3 | Status: SHIPPED | OUTPATIENT
Start: 2024-05-28

## 2024-05-28 NOTE — TELEPHONE ENCOUNTER
The patient called for a short fill due to mail delay.  Please call the patient  sertraline (Zoloft) 100 mg tablet

## 2024-05-30 DIAGNOSIS — F33.0 MAJOR DEPRESSIVE DISORDER, RECURRENT EPISODE, MILD DEGREE (CMS-HCC): ICD-10-CM

## 2024-05-31 RX ORDER — TRAZODONE HYDROCHLORIDE 150 MG/1
300 TABLET ORAL NIGHTLY
Qty: 90 TABLET | Refills: 2 | Status: SHIPPED | OUTPATIENT
Start: 2024-05-31

## 2024-06-19 ENCOUNTER — LAB (OUTPATIENT)
Dept: LAB | Facility: LAB | Age: 64
End: 2024-06-19
Payer: MEDICARE

## 2024-06-19 DIAGNOSIS — E10.10 TYPE 1 DIABETES MELLITUS WITH KETOACIDOSIS WITHOUT COMA (MULTI): ICD-10-CM

## 2024-06-19 DIAGNOSIS — T86.19 OTHER COMPLICATION OF KIDNEY TRANSPLANT (HHS-HCC): ICD-10-CM

## 2024-06-19 DIAGNOSIS — Z94.0 KIDNEY REPLACED BY TRANSPLANT (HHS-HCC): ICD-10-CM

## 2024-06-19 DIAGNOSIS — N28.9 ABNORMAL RENAL FUNCTION: ICD-10-CM

## 2024-06-19 LAB
ALBUMIN SERPL BCP-MCNC: 4.1 G/DL (ref 3.4–5)
ANION GAP SERPL CALC-SCNC: 16 MMOL/L (ref 10–20)
BUN SERPL-MCNC: 18 MG/DL (ref 6–23)
CALCIUM SERPL-MCNC: 9.7 MG/DL (ref 8.6–10.6)
CHLORIDE SERPL-SCNC: 104 MMOL/L (ref 98–107)
CO2 SERPL-SCNC: 23 MMOL/L (ref 21–32)
CREAT SERPL-MCNC: 1.31 MG/DL (ref 0.5–1.05)
EGFRCR SERPLBLD CKD-EPI 2021: 45 ML/MIN/1.73M*2
ERYTHROCYTE [DISTWIDTH] IN BLOOD BY AUTOMATED COUNT: 14.2 % (ref 11.5–14.5)
FERRITIN SERPL-MCNC: 37 NG/ML (ref 8–150)
GLUCOSE SERPL-MCNC: 317 MG/DL (ref 74–99)
HCT VFR BLD AUTO: 40.6 % (ref 36–46)
HGB BLD-MCNC: 12.9 G/DL (ref 12–16)
IRON SATN MFR SERPL: 26 % (ref 25–45)
IRON SERPL-MCNC: 74 UG/DL (ref 35–150)
MCH RBC QN AUTO: 29.9 PG (ref 26–34)
MCHC RBC AUTO-ENTMCNC: 31.8 G/DL (ref 32–36)
MCV RBC AUTO: 94 FL (ref 80–100)
NRBC BLD-RTO: 0 /100 WBCS (ref 0–0)
PHOSPHATE SERPL-MCNC: 4.3 MG/DL (ref 2.5–4.9)
PLATELET # BLD AUTO: 150 X10*3/UL (ref 150–450)
POTASSIUM SERPL-SCNC: 4.8 MMOL/L (ref 3.5–5.3)
PTH-INTACT SERPL-MCNC: 81.1 PG/ML (ref 18.5–88)
RBC # BLD AUTO: 4.32 X10*6/UL (ref 4–5.2)
SODIUM SERPL-SCNC: 138 MMOL/L (ref 136–145)
TIBC SERPL-MCNC: 290 UG/DL (ref 240–445)
UIBC SERPL-MCNC: 216 UG/DL (ref 110–370)
WBC # BLD AUTO: 8 X10*3/UL (ref 4.4–11.3)

## 2024-06-19 PROCEDURE — 83550 IRON BINDING TEST: CPT

## 2024-06-19 PROCEDURE — 36415 COLL VENOUS BLD VENIPUNCTURE: CPT

## 2024-06-19 PROCEDURE — 80069 RENAL FUNCTION PANEL: CPT

## 2024-06-19 PROCEDURE — 85027 COMPLETE CBC AUTOMATED: CPT

## 2024-06-19 PROCEDURE — 82728 ASSAY OF FERRITIN: CPT

## 2024-06-19 PROCEDURE — 83970 ASSAY OF PARATHORMONE: CPT

## 2024-06-19 PROCEDURE — 83540 ASSAY OF IRON: CPT

## 2024-07-17 ENCOUNTER — APPOINTMENT (OUTPATIENT)
Dept: NEPHROLOGY | Facility: CLINIC | Age: 64
End: 2024-07-17
Payer: MEDICARE

## 2024-07-18 DIAGNOSIS — R60.0 LOCALIZED EDEMA: Primary | ICD-10-CM

## 2024-07-18 RX ORDER — FUROSEMIDE 40 MG/1
40 TABLET ORAL DAILY
Qty: 30 TABLET | Refills: 1 | Status: SHIPPED | OUTPATIENT
Start: 2024-07-18 | End: 2024-09-16

## 2024-07-24 DIAGNOSIS — F33.0 MAJOR DEPRESSIVE DISORDER, RECURRENT EPISODE, MILD DEGREE (CMS-HCC): ICD-10-CM

## 2024-07-24 RX ORDER — TRAZODONE HYDROCHLORIDE 150 MG/1
300 TABLET ORAL NIGHTLY
Qty: 90 TABLET | Refills: 2 | Status: SHIPPED | OUTPATIENT
Start: 2024-07-24

## 2024-08-19 ENCOUNTER — APPOINTMENT (OUTPATIENT)
Dept: NEPHROLOGY | Facility: CLINIC | Age: 64
End: 2024-08-19
Payer: MEDICARE

## 2024-08-19 VITALS
HEART RATE: 63 BPM | HEIGHT: 62 IN | SYSTOLIC BLOOD PRESSURE: 153 MMHG | DIASTOLIC BLOOD PRESSURE: 78 MMHG | WEIGHT: 113 LBS | BODY MASS INDEX: 20.8 KG/M2 | RESPIRATION RATE: 16 BRPM | OXYGEN SATURATION: 97 % | TEMPERATURE: 97.5 F

## 2024-08-19 DIAGNOSIS — E10.10 TYPE 1 DIABETES MELLITUS WITH KETOACIDOSIS WITHOUT COMA (MULTI): ICD-10-CM

## 2024-08-19 DIAGNOSIS — Z94.0 KIDNEY REPLACED BY TRANSPLANT (HHS-HCC): Primary | ICD-10-CM

## 2024-08-19 DIAGNOSIS — R60.0 LOCALIZED EDEMA: ICD-10-CM

## 2024-08-19 PROCEDURE — 3077F SYST BP >= 140 MM HG: CPT | Performed by: INTERNAL MEDICINE

## 2024-08-19 PROCEDURE — 3060F POS MICROALBUMINURIA REV: CPT | Performed by: INTERNAL MEDICINE

## 2024-08-19 PROCEDURE — 99215 OFFICE O/P EST HI 40 MIN: CPT | Performed by: INTERNAL MEDICINE

## 2024-08-19 PROCEDURE — 1159F MED LIST DOCD IN RCRD: CPT | Performed by: INTERNAL MEDICINE

## 2024-08-19 PROCEDURE — 3078F DIAST BP <80 MM HG: CPT | Performed by: INTERNAL MEDICINE

## 2024-08-19 PROCEDURE — 3008F BODY MASS INDEX DOCD: CPT | Performed by: INTERNAL MEDICINE

## 2024-08-19 RX ORDER — FUROSEMIDE 40 MG/1
40 TABLET ORAL DAILY
Qty: 30 TABLET | Refills: 1 | Status: SHIPPED | OUTPATIENT
Start: 2024-08-19 | End: 2024-10-18

## 2024-08-19 NOTE — PROGRESS NOTES
Subjective       Yin Ojeda is a 64 y.o. 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     Was in [place] a few months ago, pants and shoes would not fit due to swelling. Now with 1 month supply of Lasix 40mg every day. Her weight is typically around 110 lbs. Was 113 lbs today. Informed that using it daily will decrease kidney function. Some difficulty urinating on the days that she does not take lasix. Discussed maintaining a low sodium diet to prevent need for Lasix. Recommended decreasing soda intake. Has been taking 1/2 pill of potassium when taking Lasix.       April 2024:  Last office visit September 2023.  Yin had no kidney related complaints or concerns today.  She successfully weaned down on her Lasix to 40 mg every few days with no significant leg swelling.  Graft function improved significantly now serum creatinine is down to 1 and GFR up to 62.  She continues to be on potassium supplements.  Blood pressure is accepted.  She has some high side problems-she continues to see the eye doctors.    Per prior notes  Patient has no new complaints today, she states that she has not taken the furosemide today. Examination was not consistent with edema or fluid retention. Vitals were all within normal limits, with BP of 148/67 (BP at home is in the range of 120-130) and heart rate of 63. Labs reviewed, hemoglobin is 12.9. Creatinine is 1.35 and EGFR is 44. No concern for metabolic bone disease, vitamin D and PTH within normal limits. Medications include Lasix 80, rosuvastatin, propranolol.           Per prior notes     Last seen September 2022. At last visit she was continued on lasix 80 mg twice dialy, . States she is no longer taking the losartan. Cr 1.55 and GFR 37 which is worsening from baseline of GFr 1.2-1.3, GFR 50. Blood pressure in  office today is at goal. Her K is 5.3. States she is on calcium supplements and still is hypercalcemic with Ca 11.3. Recently started Prolia shots.         Per Prior note,   Yin came today with her identical twin (the kidney donor). She feels in her baseline state of health. She did not do well after we tried to decrease Lasix. Currently she is on 160 mg Lasix daily (80 mg twice daily). Every time attempt to decrease diuretic dose she does not feel good as she gets the sense of leg swelling. She reports she has been following with endocrinology and she was on Prolia for severe osteoporosis. Recently, after her last fall she stopped Prolia as she thought it does not work. She had blood work done every 3 months as instructed. Graft function is stable 50%. Hypercalcemia noticed. Blood pressures elevated in office today but reports good control at home 118/125 over 70s-infrequent checks. No complaints or concerns today. She reports occasional use of Advil for generalized abdominal pain     Her prior notes     Yin came today with her older sister. She reports to be in her baseline state of health. She had recent hospital admission in February 2022 at [facility] after she broke her hip. She had UTI/pyelonephritis/KADEEM serum creatinine went up from her baseline less than 1-1.8. UTI was treated with ceftriaxone. KADEEM resolved. Patient has been taking Lasix 200 mg daily for leg swelling. She is wondering if she can go up on Lasix. Today do not recommend going up on Lasix based on her exam. She denies lower urinary tract symptoms. Blood pressure is in target. She is adherent to lab work every 3 months. No significant NSAID use at home     Family history: Identical twin with no history of type 1 diabetes  Social history: Multiple fractures and hip surgeries       Objective   There were no vitals taken for this visit.  Wt Readings from Last 3 Encounters:   04/01/24 49 kg (108 lb)   09/20/23 50.9 kg (112 lb 4 oz)  "  09/07/23 50 kg (110 lb 3.2 oz)       Physical Exam  General appearance: no distress awake and alert on room air, euvolemic on exam  Eyes: non-icteric  HEENT: atrumatic head, PEERLA, moist mucosa  Skin: no apparent rash  Heart: NSR, S1, S2 normal, murmur present  Lungs: Symmetrical expansion,CTA bilat no wheezing/crackles  Abdomen: soft, nt/nd, obese  Extremities: no edema bilat  Neuro: No FND,asterixis, no focal deficits noticed     Review of Systems     Constitutional: no fever, no chills, no recent weight gain and no recent weight loss.   Eyes: no blurred vision and no diplopia.   ENT: no hearing loss, no earache, no sore throat, no swollen glands in the neck and no nasal discharge.   Cardiovascular: no chest pain, no palpitations, + lower extremity edema.   Respiratory: no shortness of breath, no chronic cough and no shortness of breath during exertion.   Gastrointestinal: no abdominal pain, no constipation, no heartburn, no vomiting, no bloody stools and no change in bowel movements.   Genitourinary: no dysuria and no hematuria.   Musculoskeletal: no arthralgias and no myalgias.   Skin: no rashes and no skin lesions.   Neurological: no headaches and no dizziness.   Psychiatric: no confusion, no depression and no anxiety.   Endocrine: no heat intolerance, no cold intolerance, appetite not increased, no thyroid disorder, no increased urinary frequency and no dry skin.   Hematologic/Lymphatic: does not bleed easily and does not bruise easily.   All other systems have been reviewed and are negative for complaint.         Data Review                   No results found for: \"URICACID\"        Lab Results   Component Value Date    HGBA1C 9.5 (A) 08/02/2024                 No lab exists for component: \"CR\", \"PHOSPHORUS\"        Albumin/Creatinine Ratio   Date Value Ref Range Status   01/15/2024 326.2 (H) <30.0 ug/mg Creat Final     Albumin/Creatine Ratio   Date Value Ref Range Status   09/28/2023 447.5 (H) 0.0 - 30.0 " ug/mg crt Final   05/12/2023 CANCELED       Comment:     Result canceled by the ancillary.            RFP  Recent Labs     06/19/24  1139 01/15/24  1200 09/28/23  1248 05/12/23  1531 01/20/23  1037 09/01/22  1017 06/14/22  1419    142 139 144 140 139 139   K 4.8 4.3 4.8 3.5 5.3 5.0 4.4    107 105 105 98 100 99   CO2 23 27 24 29 31 29 32   BUN 18 16 17 17 24* 14 16   CREATININE 1.31* 1.02 0.94 1.35* 1.55* 1.22* 1.32*   GLUCOSE 317* 149* 279* 176* 338* 240* 217*   CALCIUM 9.7 10.0 10.1 10.0 11.3* 10.5* 9.8   PHOS 4.3 3.6 3.7 3.1 5.3* 4.2 4.5   EGFR 45* 62  --   --   --   --   --    ANIONGAP 16 12 15 14 16 15 12        Urineanalysis  Recent Labs     10/13/23  1409 09/28/23  1248 05/12/23  1531 01/20/23  1037 05/26/22  0920 03/24/22  1400 02/03/22  1824 01/31/22  0141 12/22/20  1521 02/12/20  0942 11/01/19  1420   COLORU Yellow  --   --  YELLOW YELLOW STRAW YELLOW YELLOW  --  YELLOW YELLOW   APPEARANCEU Hazy*  --   --  CLEAR HAZY CLEAR HAZY CLEAR  --  HAZY CLEAR   SPECGRAVU 1.017  --   --  1.010 1.008 1.006 1.009 1.013  --  1.010 1.010   JAMAR 6.0  --   --  6.0 6.0 6.0 6.0 6.0  --  6.0 6.0   PROTUR 100 (2+)* 94* CANCELED NEGATIVE NEGATIVE NEGATIVE 100(2+)* NEGATIVE 17 NEGATIVE NEGATIVE   GLUCOSEU 50 (1+)*  --   --  NEGATIVE NEGATIVE 50(1+)* 50(1+)* NEGATIVE  --  50 (TRACE)* NEGATIVE   BLOODU NEGATIVE  --   --  NEGATIVE SMALL(1+)* SMALL(1+)* MODERATE(2+)* NEGATIVE  --  NEGATIVE NEGATIVE   KETONESU NEGATIVE  --   --  NEGATIVE NEGATIVE NEGATIVE NEGATIVE NEGATIVE  --  NEGATIVE NEGATIVE   BILIRUBINU NEGATIVE  --   --  NEGATIVE NEGATIVE NEGATIVE NEGATIVE NEGATIVE  --  NEGATIVE NEGATIVE   NITRITEU NEGATIVE  --   --  NEGATIVE NEGATIVE NEGATIVE NEGATIVE NEGATIVE  --  NEGATIVE NEGATIVE   LEUKOCYTESU SMALL (1+)*  --   --  TRACE* LARGE(3+)* MODERATE(2+)* LARGE(3+)* NEGATIVE  --  NEGATIVE NEGATIVE       Urine Electrolytes  Recent Labs     01/15/24  1200 10/13/23  1409 09/28/23  1248 05/12/23  1531 01/20/23  1037  05/26/22  0920 03/24/22  1400 02/03/22  1824 01/31/22  0141 12/22/20  1521 02/12/20  0942 11/01/19  1420   CREATU 80.1  80.1  --  76.5  76.5 CANCELED  CANCELED 60.5 52.9  --   --   --  54.3  --   --    PROTUR  --  100 (2+)* 94* CANCELED NEGATIVE NEGATIVE NEGATIVE 100(2+)* NEGATIVE 17 NEGATIVE NEGATIVE   UTPCR 0.92*  --  1.23* CANCELED  --   --   --   --   --  0.31*  --   --    ALBUMINUR 261.3  --   --   --   --   --   --   --   --   --   --   --    MICROALBCREA 326.2*  --  447.5* CANCELED 96.9* 50.9*  --   --   --   --   --   --         Urine Micro  Recent Labs     10/13/23  1409 01/20/23  1037 05/26/22  0920 03/24/22  1400 02/03/22  1824   WBCU 6-10* 3 64* 44* >182*   RBCU 1-2 <1 1 1 122*   HYALCASTU  --  4+* 1+* 3+*  --    SQUAMEPIU 1-9 (SPARSE) 1 1 1  --    BACTERIAU  --  1+*  --  1+* 1+*   MUCUSU 1+ FEW FEW FEW FEW        Iron  Recent Labs     06/19/24  1139   IRON 74   TIBC 290   IRONSAT 26   FERRITIN 37          Current Outpatient Medications on File Prior to Visit   Medication Sig Dispense Refill    aspirin 81 mg EC tablet Take 1 tablet (81 mg) by mouth 2 times a day.      biotin 5 mg capsule Take 1 tablet by mouth once daily.      buPROPion XL (Wellbutrin XL) 150 mg 24 hr tablet Take 1 tablet (150 mg) by mouth once daily in the morning. Take before meals.      buPROPion XL (Wellbutrin XL) 300 mg 24 hr tablet Take 1 tablet (300 mg) by mouth once daily in the morning. 90 tablet 3    clonazePAM (KlonoPIN) 1 mg tablet Take 1 tablet (1 mg) by mouth once daily.      furosemide (Lasix) 40 mg tablet Take 1 tablet (40 mg) by mouth once daily. As needed for swelling 30 tablet 1    gabapentin (Neurontin) 100 mg capsule Take 2 capsules (200 mg) by mouth 2 times a day as needed.      insulin lispro (HumaLOG) 100 unit/mL injection Inject 10 Units under the skin 3 times a day with meals.      Klor-Con M20 20 mEq ER tablet Take 0.5 tablets (10 mEq) by mouth once daily.      lactobacillus acidophilus (acidophilus-pectin,  citrus) tablet tablet Take 1 tablet by mouth once daily.      pantoprazole (ProtoNix) 40 mg EC tablet Take 1 tablet (40 mg) by mouth once daily.      polyethylene glycol (Glycolax, Miralax) 17 gram/dose powder Take 17 g by mouth.      propranolol (Inderal) 20 mg tablet Take 1 tablet (20 mg) by mouth once daily.      rosuvastatin (Crestor) 20 mg tablet Take 1 tablet (20 mg) by mouth once daily.      saccharomyces boulardii (Florastor) 250 mg capsule Take 1 capsule (250 mg) by mouth 2 times a day.      sennosides-docusate sodium (Rose-Colace) 8.6-50 mg tablet Take 2 tablets by mouth twice a day.      sertraline (Zoloft) 100 mg tablet Take 1 tablet (100 mg) by mouth once daily. 90 tablet 3    Toujeo SoloStar U-300 Insulin 300 unit/mL (1.5 mL) injection Inject 20 Units under the skin once daily at bedtime.      traZODone (Desyrel) 150 mg tablet TAKE TWO TABLETS BY MOUTH AT BEDTIME 90 tablet 2     No current facility-administered medications on file prior to visit.           Assessment and Plan   Yin Ojeda  is a 64 y.o. 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   -Suspected graft dysfunction due to Lasix. This was evident when GFR improved to 60 after stopping daily Lasix, which has now been reduced again with daily use. I discussed with patient several times that she does not need that much of diuretics based on her exam. She would like  to continue it for leg swelling.  Restarting Lasix PRN again with 90 day supply.   -Blood Pressure Control: Propranolol 20mg every day and on Lasix 40 mg as needed. Was started on losartan for hypertension and graft GFR preservation but she is not currently on it. Not a candidate for SGL 2 inhibitor due to increased risk of ketoacidosis with type 1 diabetes.       - Continue Klor-Con M20 when taking dose of Lasix   -Continue holding Calcium and Vitamin D supplements   -Continue Prolia, will consider restarting Ca in the future is levels fall and she remains getting Prolia shots. PTH is normal    Follow-up in 6 months with repeat blood work    PETRONA Thornton IV, MD, MS, QASIM MARQUEZ   Clinical  - [employer] [facility] [employer]   Nephrologist - [facility]

## 2024-08-19 NOTE — PATIENT INSTRUCTIONS
Dear NIGEL   It was nice seeing you in the nephrology clinic today     Today we discussed the following:  #Kidney transplant from identical twin in 1998 not on immunosuppression medication. Graft function is worsening down to 45% from prior 62% since she started taking Lasix daily.  I recommend take Lasix only as needed. Discussed appropriate hydration today.  Will continue to check blood work every 3-6 months. Follow-up in office every 6 months      #Hypertension-blood pressure is elevated today.  Please check blood pressure at home and send me blood pressure log to review.  Continue Lasix 40 mg as needed.  Continue potassium supplements the days you take Lasix    #Elevated calcium now normal-continue to hold calcium supplements. I recommend continuing Prolia with endocrinology.     #High potassium-now normal-continue half pill of potassium the days you take Lasix     Office visit every 6 months. There is recurring orders from us every 6 months under my name        Please call our office if you have any question  Thank you for coming to see me today     Zack Hernandez MD, MS, QASIM MARQUEZ  Clinical  - [facility] [employer]  Nephrologist - [facility]

## 2024-08-26 ENCOUNTER — TELEPHONE (OUTPATIENT)
Dept: PRIMARY CARE | Facility: CLINIC | Age: 64
End: 2024-08-26
Payer: MEDICARE

## 2024-08-26 NOTE — TELEPHONE ENCOUNTER
patient called and stated that she is out of her antidepressants. stated that she is unable to get it because it was denied at the pharmacy. would like it sent over sometime today to [facility].

## 2024-08-27 DIAGNOSIS — F33.0 MAJOR DEPRESSIVE DISORDER, RECURRENT EPISODE, MILD DEGREE (CMS-HCC): ICD-10-CM

## 2024-08-27 RX ORDER — SERTRALINE HYDROCHLORIDE 100 MG/1
100 TABLET, FILM COATED ORAL DAILY
Qty: 90 TABLET | Refills: 3 | Status: SHIPPED | OUTPATIENT
Start: 2024-08-27

## 2024-09-23 ENCOUNTER — LAB (OUTPATIENT)
Dept: LAB | Facility: LAB | Age: 64
End: 2024-09-23
Payer: MEDICARE

## 2024-09-23 DIAGNOSIS — T86.19 OTHER COMPLICATION OF KIDNEY TRANSPLANT: ICD-10-CM

## 2024-09-23 DIAGNOSIS — N28.9 ABNORMAL RENAL FUNCTION: ICD-10-CM

## 2024-09-23 DIAGNOSIS — E10.10 TYPE 1 DIABETES MELLITUS WITH KETOACIDOSIS WITHOUT COMA: ICD-10-CM

## 2024-09-23 DIAGNOSIS — Z94.0 KIDNEY REPLACED BY TRANSPLANT (HHS-HCC): ICD-10-CM

## 2024-09-23 LAB
ALBUMIN SERPL BCP-MCNC: 4 G/DL (ref 3.4–5)
ANION GAP SERPL CALC-SCNC: 12 MMOL/L (ref 10–20)
BUN SERPL-MCNC: 14 MG/DL (ref 6–23)
CALCIUM SERPL-MCNC: 9.5 MG/DL (ref 8.6–10.6)
CHLORIDE SERPL-SCNC: 109 MMOL/L (ref 98–107)
CO2 SERPL-SCNC: 27 MMOL/L (ref 21–32)
CREAT SERPL-MCNC: 1.19 MG/DL (ref 0.5–1.05)
CREAT UR-MCNC: 139.3 MG/DL (ref 20–320)
EGFRCR SERPLBLD CKD-EPI 2021: 51 ML/MIN/1.73M*2
ERYTHROCYTE [DISTWIDTH] IN BLOOD BY AUTOMATED COUNT: 14.1 % (ref 11.5–14.5)
FERRITIN SERPL-MCNC: 35 NG/ML (ref 8–150)
GLUCOSE SERPL-MCNC: 58 MG/DL (ref 74–99)
HCT VFR BLD AUTO: 40 % (ref 36–46)
HGB BLD-MCNC: 12.9 G/DL (ref 12–16)
IRON SATN MFR SERPL: 20 % (ref 25–45)
IRON SERPL-MCNC: 63 UG/DL (ref 35–150)
MCH RBC QN AUTO: 30.1 PG (ref 26–34)
MCHC RBC AUTO-ENTMCNC: 32.3 G/DL (ref 32–36)
MCV RBC AUTO: 94 FL (ref 80–100)
MICROALBUMIN UR-MCNC: 436.3 MG/L
MICROALBUMIN/CREAT UR: 313.2 UG/MG CREAT
NRBC BLD-RTO: 0 /100 WBCS (ref 0–0)
PHOSPHATE SERPL-MCNC: 3.8 MG/DL (ref 2.5–4.9)
PLATELET # BLD AUTO: 146 X10*3/UL (ref 150–450)
POTASSIUM SERPL-SCNC: 3.9 MMOL/L (ref 3.5–5.3)
PTH-INTACT SERPL-MCNC: 71.8 PG/ML (ref 18.5–88)
RBC # BLD AUTO: 4.28 X10*6/UL (ref 4–5.2)
SODIUM SERPL-SCNC: 144 MMOL/L (ref 136–145)
TIBC SERPL-MCNC: 309 UG/DL (ref 240–445)
UIBC SERPL-MCNC: 246 UG/DL (ref 110–370)
WBC # BLD AUTO: 8.7 X10*3/UL (ref 4.4–11.3)

## 2024-09-23 PROCEDURE — 83550 IRON BINDING TEST: CPT

## 2024-09-23 PROCEDURE — 82728 ASSAY OF FERRITIN: CPT

## 2024-09-23 PROCEDURE — 83540 ASSAY OF IRON: CPT

## 2024-09-23 PROCEDURE — 36415 COLL VENOUS BLD VENIPUNCTURE: CPT

## 2024-09-23 PROCEDURE — 83970 ASSAY OF PARATHORMONE: CPT

## 2024-09-23 PROCEDURE — 82043 UR ALBUMIN QUANTITATIVE: CPT

## 2024-09-23 PROCEDURE — 80069 RENAL FUNCTION PANEL: CPT

## 2024-09-23 PROCEDURE — 82570 ASSAY OF URINE CREATININE: CPT

## 2024-09-23 PROCEDURE — 85027 COMPLETE CBC AUTOMATED: CPT

## 2024-10-12 DIAGNOSIS — I10 PRIMARY HYPERTENSION: Primary | ICD-10-CM

## 2024-10-14 DIAGNOSIS — F33.0 MAJOR DEPRESSIVE DISORDER, RECURRENT EPISODE, MILD DEGREE (CMS-HCC): ICD-10-CM

## 2024-10-14 RX ORDER — TRAZODONE HYDROCHLORIDE 150 MG/1
300 TABLET ORAL NIGHTLY
Qty: 90 TABLET | Refills: 2 | Status: SHIPPED | OUTPATIENT
Start: 2024-10-14

## 2024-10-14 RX ORDER — PROPRANOLOL HYDROCHLORIDE 10 MG/1
20 TABLET ORAL DAILY
Qty: 180 TABLET | Refills: 2 | Status: SHIPPED | OUTPATIENT
Start: 2024-10-14

## 2024-10-23 DIAGNOSIS — I10 PRIMARY HYPERTENSION: ICD-10-CM

## 2024-10-23 RX ORDER — PROPRANOLOL HYDROCHLORIDE 10 MG/1
20 TABLET ORAL DAILY
Qty: 180 TABLET | Refills: 2 | Status: SHIPPED | OUTPATIENT
Start: 2024-10-23

## 2024-11-06 DIAGNOSIS — E10.10 TYPE 1 DIABETES MELLITUS WITH KETOACIDOSIS WITHOUT COMA: ICD-10-CM

## 2024-11-06 DIAGNOSIS — R60.0 LOCALIZED EDEMA: ICD-10-CM

## 2024-11-06 DIAGNOSIS — Z94.0 KIDNEY REPLACED BY TRANSPLANT (HHS-HCC): ICD-10-CM

## 2024-11-06 RX ORDER — FUROSEMIDE 40 MG/1
40 TABLET ORAL DAILY PRN
Qty: 30 TABLET | Refills: 1 | Status: SHIPPED | OUTPATIENT
Start: 2024-11-06

## 2024-11-27 ENCOUNTER — TELEMEDICINE (OUTPATIENT)
Dept: PRIMARY CARE | Facility: CLINIC | Age: 64
End: 2024-11-27
Payer: MEDICARE

## 2024-11-27 ENCOUNTER — TELEPHONE (OUTPATIENT)
Dept: PRIMARY CARE | Facility: CLINIC | Age: 64
End: 2024-11-27

## 2024-11-27 DIAGNOSIS — M25.532 BILATERAL WRIST PAIN: ICD-10-CM

## 2024-11-27 DIAGNOSIS — N30.00 ACUTE CYSTITIS WITHOUT HEMATURIA: Primary | ICD-10-CM

## 2024-11-27 DIAGNOSIS — M25.531 BILATERAL WRIST PAIN: ICD-10-CM

## 2024-11-27 PROCEDURE — 3062F POS MACROALBUMINURIA REV: CPT | Performed by: INTERNAL MEDICINE

## 2024-11-27 PROCEDURE — 99441 PR PHYS/QHP TELEPHONE EVALUATION 5-10 MIN: CPT | Performed by: INTERNAL MEDICINE

## 2024-11-27 RX ORDER — CEFUROXIME AXETIL 250 MG/1
250 TABLET ORAL 2 TIMES DAILY
Qty: 14 TABLET | Refills: 0 | Status: SHIPPED | OUTPATIENT
Start: 2024-11-27 | End: 2024-12-04

## 2024-11-27 NOTE — TELEPHONE ENCOUNTER
Patient states she has a urinary infection or bladder infection. States she's had this before and you prescribed antibiotics. She did state she is blind and not able to drive and is not able to come in office to leave a specimen. Would like to know if you could start antibiotic and she can schedule an appt if she isnt feeling better.

## 2024-11-27 NOTE — PROGRESS NOTES
Subjective   Patient ID: Yin Ojeda is a 64 y.o. female who presents for UTI symptoms    HPI   The patient reports a history of urinary urgency, dysuria, frequency x 4-5 days.  She reports no other associated symptoms.  Review of Systems    Objective   There were no vitals taken for this visit.    Physical Exam  General-the patient is alert and oriented x 3 and is in no apparent distress.  Assessment/Plan   Problem List Items Addressed This Visit             ICD-10-CM    Urinary tract infection - Primary N39.0    Relevant Medications    cefuroxime (Ceftin) 250 mg tablet    Other Relevant Orders    Urinalysis with Reflex Culture and Microscopic        Assessment  Urgency, dysuria, frequency-be secondary to uncomplicated urinary tract infection.  Plan  I will empirically start the patient on cefuroxime 250 mg p.o. twice daily x 7 days.  The patient will call me in 48 hours with her condition or sooner if symptoms worsen      6 minutes spent in patient care

## 2024-11-27 NOTE — PROGRESS NOTES
Subjective   Patient ID: Yin Ojeda is a 64 y.o. female who presents for UTI symptoms    HPI   The patient reports a history of urinary urgency, dysuria, frequency x 4-5 days. She reports no other associated symptoms.   Review of Systems    Objective   There were no vitals taken for this visit.    Physical Exam  General-the patient is alert and oriented x 3 and is in no apparent distress.   Assessment/Plan         Assessment  Urgency, dysuria, frequency-be secondary to uncomplicated urinary tract infection.  Plan  I will empirically start the patient on cefuroxime 250 mg p.o. twice daily x 7 days.  The patient will call me in 48 hours with her condition or sooner if symptoms worsen        6 minutes spent in patient care

## 2024-12-09 ENCOUNTER — OFFICE VISIT (OUTPATIENT)
Dept: ORTHOPEDIC SURGERY | Facility: HOSPITAL | Age: 64
End: 2024-12-09
Payer: MEDICARE

## 2024-12-09 ENCOUNTER — HOSPITAL ENCOUNTER (OUTPATIENT)
Dept: RADIOLOGY | Facility: HOSPITAL | Age: 64
Discharge: HOME | End: 2024-12-09
Payer: MEDICARE

## 2024-12-09 DIAGNOSIS — M79.642 BILATERAL HAND PAIN: ICD-10-CM

## 2024-12-09 DIAGNOSIS — M25.532 BILATERAL WRIST PAIN: ICD-10-CM

## 2024-12-09 DIAGNOSIS — M25.531 BILATERAL WRIST PAIN: ICD-10-CM

## 2024-12-09 DIAGNOSIS — M79.641 BILATERAL HAND PAIN: ICD-10-CM

## 2024-12-09 DIAGNOSIS — M79.641 BILATERAL HAND PAIN: Primary | ICD-10-CM

## 2024-12-09 DIAGNOSIS — M79.642 BILATERAL HAND PAIN: Primary | ICD-10-CM

## 2024-12-09 PROCEDURE — 73130 X-RAY EXAM OF HAND: CPT | Mod: BILATERAL PROCEDURE | Performed by: RADIOLOGY

## 2024-12-09 PROCEDURE — 99203 OFFICE O/P NEW LOW 30 MIN: CPT | Performed by: STUDENT IN AN ORGANIZED HEALTH CARE EDUCATION/TRAINING PROGRAM

## 2024-12-09 PROCEDURE — 73130 X-RAY EXAM OF HAND: CPT | Mod: 50

## 2024-12-09 PROCEDURE — 99213 OFFICE O/P EST LOW 20 MIN: CPT | Performed by: STUDENT IN AN ORGANIZED HEALTH CARE EDUCATION/TRAINING PROGRAM

## 2024-12-23 ENCOUNTER — APPOINTMENT (OUTPATIENT)
Dept: NEPHROLOGY | Facility: CLINIC | Age: 64
End: 2024-12-23
Payer: MEDICARE

## 2025-01-08 DIAGNOSIS — F33.0 MAJOR DEPRESSIVE DISORDER, RECURRENT EPISODE, MILD DEGREE (CMS-HCC): ICD-10-CM

## 2025-01-08 RX ORDER — BUPROPION HYDROCHLORIDE 300 MG/1
300 TABLET ORAL EVERY MORNING
Qty: 90 TABLET | Refills: 3 | Status: SHIPPED | OUTPATIENT
Start: 2025-01-08

## 2025-01-11 DIAGNOSIS — R60.0 LOCALIZED EDEMA: ICD-10-CM

## 2025-01-11 DIAGNOSIS — F33.0 MAJOR DEPRESSIVE DISORDER, RECURRENT EPISODE, MILD DEGREE (CMS-HCC): ICD-10-CM

## 2025-01-11 DIAGNOSIS — E10.10 TYPE 1 DIABETES MELLITUS WITH KETOACIDOSIS WITHOUT COMA: ICD-10-CM

## 2025-01-11 DIAGNOSIS — Z94.0 KIDNEY REPLACED BY TRANSPLANT (HHS-HCC): ICD-10-CM

## 2025-01-13 RX ORDER — FUROSEMIDE 40 MG/1
40 TABLET ORAL DAILY PRN
Qty: 30 TABLET | Refills: 1 | Status: SHIPPED | OUTPATIENT
Start: 2025-01-13

## 2025-01-13 RX ORDER — TRAZODONE HYDROCHLORIDE 150 MG/1
300 TABLET ORAL NIGHTLY
Qty: 90 TABLET | Refills: 2 | Status: SHIPPED | OUTPATIENT
Start: 2025-01-13

## 2025-02-16 NOTE — PROGRESS NOTES
History of Present Illness:  No chief complaint on file.      The patient presents today for evaluation of bilateral wrist and hand pain.  Symptoms have been longstanding and been present for many years.  She notes most of the pain is in her bilateral wrists.  She also notes pain to the bilateral thumbs.  This is more specific to the base of the thumbs and radial side of the fist.  She notes this is worse with movement.  The left is worse than the right.  She notes a mild numb and tingle in the right hand.  She rates her right hand and wrist pain a 1 out of 10.  She states that her left side is throbbing and rates it 8 out of 10.  This is worse with movement.  She does have neuropathy.  She has had a previous trigger finger surgery but this was over 15 years ago.  She has had no EMGs MRIs.  She has had an injection approximately 15 years ago likely for trigger finger.  She does note that the pain radiates to her elbow.  She does smoke cigarettes.  She does have a history of kidney transplant and cannot take anti-inflammatories.  She is diabetic.     Pain is worse with gripping, pinching and twisting.  The following treatments have been attempted: Braces    No numbness or tingling.      She was seen with her sister present.    Past Medical History:   Diagnosis Date    Major depressive disorder, recurrent, moderate (Multi) 10/27/2020    Major depressive disorder, recurrent episode, moderate with anxious distress    Other conditions influencing health status     Retinopathy    Other specified anxiety disorders 03/24/2016    Depression with anxiety    Personal history of other diseases of the nervous system and sense organs     History of cataract    Personal history of other mental and behavioral disorders 09/25/2017    History of depression    Personal history of other specified conditions 02/09/2021    History of insomnia       Medication Documentation Review Audit       Reviewed by Laisha Paulson MA (Medical  Assistant) on 08/19/24 at 1537      Medication Order Taking? Sig Documenting Provider Last Dose Status   aspirin 81 mg EC tablet [id number] Yes Take 1 tablet (81 mg) by mouth 2 times a day. Brittny Becker MD Taking Active   biotin 5 mg capsule [id number] Yes Take 1 tablet by mouth once daily. Brittny Becker MD Taking Active   buPROPion XL (Wellbutrin XL) 150 mg 24 hr tablet [id number]  Take 1 tablet (150 mg) by mouth once daily in the morning. Take before meals. Brittny Becker MD  Active   buPROPion XL (Wellbutrin XL) 300 mg 24 hr tablet [id number] Yes Take 1 tablet (300 mg) by mouth once daily in the morning. Juan Finch MD Taking Active   clonazePAM (KlonoPIN) 1 mg tablet [id number] Yes Take 1 tablet (1 mg) by mouth once daily. Brittny Becker MD Taking Active   furosemide (Lasix) 40 mg tablet [id number] Yes Take 1 tablet (40 mg) by mouth once daily. As needed for swelling Zack Hernandez MD Taking Active   gabapentin (Neurontin) 100 mg capsule [id number]  Take 2 capsules (200 mg) by mouth 2 times a day as needed. Brittny Becker MD  Active   insulin lispro (HumaLOG) 100 unit/mL injection [id number] Yes Inject 10 Units under the skin 3 times daily (morning, midday, late afternoon). Brittny Becker MD Taking Active   Klor-Con M20 20 mEq ER tablet [id number] No Take 0.5 tablets (10 mEq) by mouth once daily. Brittny Becker MD Not Taking Active   lactobacillus acidophilus (acidophilus-pectin, citrus) tablet tablet [id number]  Take 1 tablet by mouth once daily. Brittny Becker MD  Active   pantoprazole (ProtoNix) 40 mg EC tablet [id number] Yes Take 1 tablet (40 mg) by mouth once daily. Brittny Becker MD Taking Active   polyethylene glycol (Glycolax, Miralax) 17 gram/dose powder [id number]  Take 17 g by mouth. Brittny Becker MD  Active   propranolol (Inderal) 20 mg tablet [id number] Yes Take 1 tablet (20 mg) by mouth once daily. Brittny Becker MD Taking Active    rosuvastatin (Crestor) 20 mg tablet [id number] Yes Take 1 tablet (20 mg) by mouth once daily. Historical Provider, MD Taking Active   saccharomyces boulardii (Florastor) 250 mg capsule [id number]  Take 1 capsule (250 mg) by mouth 2 times a day. Historical Provider, MD  Active   sennosides-docusate sodium (Rose-Colace) 8.6-50 mg tablet [id number]  Take 2 tablets by mouth twice a day. Historical Provider, MD  Active   sertraline (Zoloft) 100 mg tablet [id number] Yes Take 1 tablet (100 mg) by mouth once daily. Juan Finch MD Taking Active   Toujeo SoloStar U-300 Insulin 300 unit/mL (1.5 mL) injection [id number] Yes Inject 20 Units under the skin once daily at bedtime. Historical Provider, MD Taking Active   traZODone (Desyrel) 150 mg tablet [id number] Yes TAKE TWO TABLETS BY MOUTH AT BEDTIME Juan Finch MD Taking Active                    Allergies   Allergen Reactions    Procaine Hives       Social History     Socioeconomic History    Marital status: Single     Spouse name: Not on file    Number of children: Not on file    Years of education: Not on file    Highest education level: Not on file   Occupational History    Not on file   Tobacco Use    Smoking status: Former     Types: Cigarettes    Smokeless tobacco: Never   Substance and Sexual Activity    Alcohol use: Not on file    Drug use: Not on file    Sexual activity: Not on file   Other Topics Concern    Not on file   Social History Narrative    Not on file     Social Drivers of Health     Financial Resource Strain: Not on file   Food Insecurity: Not on file   Transportation Needs: Not on file   Physical Activity: Not on file   Stress: Not on file   Social Connections: Not on file   Intimate Partner Violence: Not on file   Housing Stability: Not on file       Past Surgical History:   Procedure Laterality Date    EYE SURGERY  12/18/2013    Eye Surgery    HAND SURGERY  12/18/2013    Hand Surgery                                                                                                                                                           LEG SURGERY  12/18/2013    Treatment Of Lower Leg Fracture    MR HEAD ANGIO WO IV CONTRAST  12/20/2016    MR HEAD ANGIO WO IV CONTRAST 12/20/2016 [facility]CILL[facility]    OTHER SURGICAL HISTORY  12/18/2013    Wrist Surgery    OTHER SURGICAL HISTORY  12/18/2013    Parathyroid Complete Parathyroidectomy    OTHER SURGICAL HISTORY  12/18/2013    Renal Transplant          Review of Systems   GENERAL: Negative for malaise, significant weight loss, fever  MUSCULOSKELETAL: see HPI  NEURO:  see HPI     Physical Examination:  Constitutional: Appears well-developed and well-nourished.  Head: Normocephalic and atraumatic.  Eyes: EOMI grossly  Cardiovascular: Intact distal pulses.   Respiratory: Effort normal. No respiratory distress.  Neurologic: Alert and oriented to person, place, and time.  Skin: Skin is warm and dry.  Hematologic / Lymphatic: No lymphedema, lymphangitis.  Psychiatric: normal mood and affect. Behavior is normal.   Musculoskeletal:  bilateral wrist/hand:  Moderate swelling to the wrist.  No thenar atrophy  No atrophy noted of hypothenar eminence   Negative Sophia's/Phalens  Sensation grossly intact to all digits  Significant tenderness to palpation to the radiocarpal joints.  This is worse over the radial scaphoid joint.  6 tenderness to the CMC joint of the thumb.  Tenderness about thumb cmc joint with positive CMC grind.  No tenderness about first dorsal compartment/thumb A1 pulley region  Radial pulse palpable  Good capillary refill     Radiographs: XR of bilateral hands taken reviewed in office today  Diffuse arthritis throughout the hands.  This is most evident at the radial scaphoid joint with erosion of the scaphoid into the radius.  There is joint space narrowing throughout the with wrist specifically at the capitolunate.  There is severe CMC arthritis with STT involvement there is severe thumb MP joint arthritis as  well.  This is worse on the right     Assessment:  Patient with bilateral radial carpal arthritis, pantrapezial arthritis, thumb MP joint arthritis.     Plan:  Diagnosis was reviewed with patient.  We discussed treatments of her bilateral radiocarpal,, CMC arthritis, thumb MP arthritis..  Non-operative modalities include symptomatic splinting, anti-inflammatories, activity modifications, hand therapy and corticosteroid injections.  We also discussed role of surgical intervention if conservative measures prove unsuccessful.     We discussed her treatment via full wrist fusion however she would lose significant notes of motion and she definitely have a long recovery.  I would want her to refrain from any cigarettes prior to any fusion procedures.  She may also continue with nonoperative treatment in the form of diclofenac gel.  Bracing.

## 2025-02-18 ENCOUNTER — APPOINTMENT (OUTPATIENT)
Dept: NEPHROLOGY | Facility: CLINIC | Age: 65
End: 2025-02-18
Payer: MEDICARE

## 2025-02-18 VITALS — HEIGHT: 62 IN | WEIGHT: 112 LBS | BODY MASS INDEX: 20.61 KG/M2

## 2025-02-18 DIAGNOSIS — Z94.0 KIDNEY REPLACED BY TRANSPLANT (HHS-HCC): ICD-10-CM

## 2025-02-18 DIAGNOSIS — T86.19 OTHER COMPLICATION OF KIDNEY TRANSPLANT: ICD-10-CM

## 2025-02-18 DIAGNOSIS — E10.10 TYPE 1 DIABETES MELLITUS WITH KETOACIDOSIS WITHOUT COMA: ICD-10-CM

## 2025-02-18 DIAGNOSIS — I10 ESSENTIAL HYPERTENSION: Primary | ICD-10-CM

## 2025-02-18 DIAGNOSIS — R60.0 LOCALIZED EDEMA: ICD-10-CM

## 2025-02-18 PROCEDURE — G2211 COMPLEX E/M VISIT ADD ON: HCPCS | Performed by: INTERNAL MEDICINE

## 2025-02-18 PROCEDURE — 3008F BODY MASS INDEX DOCD: CPT | Performed by: INTERNAL MEDICINE

## 2025-02-18 PROCEDURE — 99214 OFFICE O/P EST MOD 30 MIN: CPT | Performed by: INTERNAL MEDICINE

## 2025-02-18 PROCEDURE — 1159F MED LIST DOCD IN RCRD: CPT | Performed by: INTERNAL MEDICINE

## 2025-02-18 RX ORDER — AMLODIPINE BESYLATE 5 MG/1
5 TABLET ORAL DAILY
Qty: 30 TABLET | Refills: 5 | Status: SHIPPED | OUTPATIENT
Start: 2025-02-18 | End: 2025-08-17

## 2025-02-18 NOTE — PATIENT INSTRUCTIONS
Dear NIGEL   It was nice seeing you in the nephrology clinic today     Today we discussed the following:  #Kidney transplant from identical twin in 1998 not on immunosuppression medication. Graft function is worsening down to 45% from prior 62% since she started taking Lasix daily.  I recommend take Lasix only as needed. Discussed appropriate hydration today.  Will continue to check blood work every 3-6 months. Follow-up in office every 6 months      #Hypertension-blood pressure is elevated.  Please check blood pressure at home and send me blood pressure log to review.  Continue Lasix 40 mg as needed.  Continue potassium supplements the days you take Lasix.  Restart amlodipine 5 mg at bedtime.  We can increase to 10 mg if blood pressure is not in target 120/80    #Elevated calcium now normal-continue to hold calcium supplements. I recommend continuing Prolia with endocrinology.     #High potassium-now normal-continue half pill of potassium the days you take Lasix     Office visit every 6 months. There is recurring orders from blood work every 3 months under my name        Please call our office if you have any question  Thank you for coming to see me today     Zack Hernandez MD, MS, QASIM MARQUEZ  Clinical  - [employer]  Nephrologist - [facility]

## 2025-02-18 NOTE — PROGRESS NOTES
Subjective       Yin Ojeda is a 64 y.o. 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    Last office visit August 2024.  Yin was evaluated virtually today.  Camera did not work and the visit was over the phone.  She did not get blood work recently as the orders were discontinued.  I put standing orders today for every 3 months blood work.  She reports elevated blood pressure 1 50-1 60 at home.  Currently she is not taking blood pressure medications.  She changed her diuretics to be only as needed 20 mg Lasix plus half pill of potassium.  Weight is stable 113 pounds.  Today we discussed repeat blood work, starting amlodipine 5 mg and possibly increase to 10 mg for blood pressure control to target 120/80.  Uncontrolled diabetes with A1c 9-working with endocrinology    Prior notes    Was in [place] a few months ago, pants and shoes would not fit due to swelling. Now with 1 month supply of Lasix 40mg every day. Her weight is typically around 110 lbs. Was 113 lbs today. Informed that using it daily will decrease kidney function. Some difficulty urinating on the days that she does not take lasix. Discussed maintaining a low sodium diet to prevent need for Lasix. Recommended decreasing soda intake. Has been taking 1/2 pill of potassium when taking Lasix.       April 2024:  Last office visit September 2023.  Yin had no kidney related complaints or concerns today.  She successfully weaned down on her Lasix to 40 mg every few days with no significant leg swelling.  Graft function improved significantly now serum creatinine is down to 1 and GFR up to 62.  She continues to be on potassium supplements.  Blood pressure is accepted.  She has some high side problems-she continues to see the eye doctors.    Per prior notes  Patient has no new complaints  today, she states that she has not taken the furosemide today. Examination was not consistent with edema or fluid retention. Vitals were all within normal limits, with BP of 148/67 (BP at home is in the range of 120-130) and heart rate of 63. Labs reviewed, hemoglobin is 12.9. Creatinine is 1.35 and EGFR is 44. No concern for metabolic bone disease, vitamin D and PTH within normal limits. Medications include Lasix 80, rosuvastatin, propranolol.           Per prior notes     Last seen September 2022. At last visit she was continued on lasix 80 mg twice dialy, . States she is no longer taking the losartan. Cr 1.55 and GFR 37 which is worsening from baseline of GFr 1.2-1.3, GFR 50. Blood pressure in office today is at goal. Her K is 5.3. States she is on calcium supplements and still is hypercalcemic with Ca 11.3. Recently started Prolia shots.         Per Prior note,   Yin came today with her identical twin (the kidney donor). She feels in her baseline state of health. She did not do well after we tried to decrease Lasix. Currently she is on 160 mg Lasix daily (80 mg twice daily). Every time attempt to decrease diuretic dose she does not feel good as she gets the sense of leg swelling. She reports she has been following with endocrinology and she was on Prolia for severe osteoporosis. Recently, after her last fall she stopped Prolia as she thought it does not work. She had blood work done every 3 months as instructed. Graft function is stable 50%. Hypercalcemia noticed. Blood pressures elevated in office today but reports good control at home 118/125 over 70s-infrequent checks. No complaints or concerns today. She reports occasional use of Advil for generalized abdominal pain     Her prior notes     Yin came today with her older sister. She reports to be in her baseline state of health. She had recent hospital admission in February 2022 at [facility] after she broke her hip. She had UTI/pyelonephritis/KADEEM  serum creatinine went up from her baseline less than 1-1.8. UTI was treated with ceftriaxone. KADEEM resolved. Patient has been taking Lasix 200 mg daily for leg swelling. She is wondering if she can go up on Lasix. Today do not recommend going up on Lasix based on her exam. She denies lower urinary tract symptoms. Blood pressure is in target. She is adherent to lab work every 3 months. No significant NSAID use at home     Family history: Identical twin with no history of type 1 diabetes  Social history: Multiple fractures and hip surgeries       Objective   There were no vitals taken for this visit.  Wt Readings from Last 3 Encounters:   08/19/24 51.3 kg (113 lb)   04/01/24 49 kg (108 lb)   09/20/23 50.9 kg (112 lb 4 oz)       Physical Exam  Virtual exam  General appearance: no distress awake and alert on room air,     Review of Systems     Constitutional: no fever, no chills, no recent weight gain and no recent weight loss.   Eyes: no blurred vision and no diplopia.   ENT: no hearing loss, no earache, no sore throat, no swollen glands in the neck and no nasal discharge.   Cardiovascular: no chest pain, no palpitations, + lower extremity edema.   Respiratory: no shortness of breath, no chronic cough and no shortness of breath during exertion.   Gastrointestinal: no abdominal pain, no constipation, no heartburn, no vomiting, no bloody stools and no change in bowel movements.   Genitourinary: no dysuria and no hematuria.   Musculoskeletal: no arthralgias and no myalgias.   Skin: no rashes and no skin lesions.   Neurological: no headaches and no dizziness.   Psychiatric: no confusion, no depression and no anxiety.   Endocrine: no heat intolerance, no cold intolerance, appetite not increased, no thyroid disorder, no increased urinary frequency and no dry skin.   Hematologic/Lymphatic: does not bleed easily and does not bruise easily.   All other systems have been reviewed and are negative for complaint.         Data  "Review                   No results found for: \"URICACID\"        Lab Results   Component Value Date    HGBA1C 9 (A) 02/07/2025                 No lab exists for component: \"CR\", \"PHOSPHORUS\"        Albumin/Creatinine Ratio   Date Value Ref Range Status   09/23/2024 313.2 (H) <30.0 ug/mg Creat Final   01/15/2024 326.2 (H) <30.0 ug/mg Creat Final     Albumin/Creatine Ratio   Date Value Ref Range Status   09/28/2023 447.5 (H) 0.0 - 30.0 ug/mg crt Final            RFP  Recent Labs     09/23/24  1047 06/19/24  1139 01/15/24  1200 09/28/23  1248 05/12/23  1531 01/20/23  1037 09/01/22  1017    138 142 139 144 140 139   K 3.9 4.8 4.3 4.8 3.5 5.3 5.0   * 104 107 105 105 98 100   CO2 27 23 27 24 29 31 29   BUN 14 18 16 17 17 24* 14   CREATININE 1.19* 1.31* 1.02 0.94 1.35* 1.55* 1.22*   GLUCOSE 58* 317* 149* 279* 176* 338* 240*   CALCIUM 9.5 9.7 10.0 10.1 10.0 11.3* 10.5*   PHOS 3.8 4.3 3.6 3.7 3.1 5.3* 4.2   EGFR 51* 45* 62  --   --   --   --    ANIONGAP 12 16 12 15 14 16 15        Urineanalysis  Recent Labs     10/13/23  1409 09/28/23  1248 05/12/23  1531 01/20/23  1037 05/26/22  0920 03/24/22  1400 02/03/22  1824 01/31/22  0141 12/22/20  1521 02/12/20  0942 11/01/19  1420   COLORU Yellow  --   --  YELLOW YELLOW STRAW YELLOW YELLOW  --  YELLOW YELLOW   APPEARANCEU Hazy*  --   --  CLEAR HAZY CLEAR HAZY CLEAR  --  HAZY CLEAR   SPECGRAVU 1.017  --   --  1.010 1.008 1.006 1.009 1.013  --  1.010 1.010   JAMAR 6.0  --   --  6.0 6.0 6.0 6.0 6.0  --  6.0 6.0   PROTUR 100 (2+)* 94* CANCELED NEGATIVE NEGATIVE NEGATIVE 100(2+)* NEGATIVE 17 NEGATIVE NEGATIVE   GLUCOSEU 50 (1+)*  --   --  NEGATIVE NEGATIVE 50(1+)* 50(1+)* NEGATIVE  --  50 (TRACE)* NEGATIVE   BLOODU NEGATIVE  --   --  NEGATIVE SMALL(1+)* SMALL(1+)* MODERATE(2+)* NEGATIVE  --  NEGATIVE NEGATIVE   KETONESU NEGATIVE  --   --  NEGATIVE NEGATIVE NEGATIVE NEGATIVE NEGATIVE  --  NEGATIVE NEGATIVE   BILIRUBINU NEGATIVE  --   --  NEGATIVE NEGATIVE NEGATIVE NEGATIVE " NEGATIVE  --  NEGATIVE NEGATIVE   NITRITEU NEGATIVE  --   --  NEGATIVE NEGATIVE NEGATIVE NEGATIVE NEGATIVE  --  NEGATIVE NEGATIVE   LEUKOCYTESU SMALL (1+)*  --   --  TRACE* LARGE(3+)* MODERATE(2+)* LARGE(3+)* NEGATIVE  --  NEGATIVE NEGATIVE       Urine Electrolytes  Recent Labs     09/23/24  1424 01/15/24  1200 10/13/23  1409 09/28/23  1248 05/12/23  1531 01/20/23  1037 05/26/22  0920 03/24/22  1400 02/03/22  1824 01/31/22  0141 12/22/20  1521 02/12/20  0942 11/01/19  1420   CREATU 139.3 80.1  80.1  --  76.5  76.5 CANCELED  CANCELED 60.5 52.9  --   --   --  54.3  --   --    PROTUR  --   --  100 (2+)* 94* CANCELED NEGATIVE NEGATIVE NEGATIVE 100(2+)* NEGATIVE 17 NEGATIVE NEGATIVE   UTPCR  --  0.92*  --  1.23* CANCELED  --   --   --   --   --  0.31*  --   --    ALBUMINUR 436.3 261.3  --   --   --   --   --   --   --   --   --   --   --    MICROALBCREA 313.2* 326.2*  --  447.5* CANCELED 96.9* 50.9*  --   --   --   --   --   --         Urine Micro  Recent Labs     10/13/23  1409 01/20/23  1037 05/26/22  0920 03/24/22  1400 02/03/22  1824   WBCU 6-10* 3 64* 44* >182*   RBCU 1-2 <1 1 1 122*   HYALCASTU  --  4+* 1+* 3+*  --    SQUAMEPIU 1-9 (SPARSE) 1 1 1  --    BACTERIAU  --  1+*  --  1+* 1+*   MUCUSU 1+ FEW FEW FEW FEW        Iron  Recent Labs     09/23/24  1047 06/19/24  1139   IRON 63 74   TIBC 309 290   IRONSAT 20* 26   FERRITIN 35 37          Current Outpatient Medications on File Prior to Visit   Medication Sig Dispense Refill    aspirin 81 mg EC tablet Take 1 tablet (81 mg) by mouth 2 times a day.      biotin 5 mg capsule Take 1 tablet by mouth once daily.      buPROPion XL (Wellbutrin XL) 300 mg 24 hr tablet TAKE 1 TABLET (300 MG) BY MOUTH ONCE DAILY IN THE MORNING. 90 tablet 3    clonazePAM (KlonoPIN) 1 mg tablet Take 1 tablet (1 mg) by mouth once daily.      DENOSUMAB SUBQ Inject under the skin.      furosemide (Lasix) 40 mg tablet TAKE 1 TABLET BY MOUTH ONCE DAILY AS NEEDED FOR SWELLING 30 tablet 1    insulin  lispro (HumaLOG) 100 unit/mL injection Inject 10 Units under the skin 3 times daily (morning, midday, late afternoon).      Klor-Con M20 20 mEq ER tablet Take 0.5 tablets (10 mEq) by mouth once daily.      pantoprazole (ProtoNix) 40 mg EC tablet Take 1 tablet (40 mg) by mouth once daily.      polyethylene glycol (Glycolax, Miralax) 17 gram/dose powder Take 17 g by mouth.      propranolol (Inderal) 10 mg tablet Take 2 tablets (20 mg) by mouth once daily. 180 tablet 2    propranolol (Inderal) 20 mg tablet Take 1 tablet (20 mg) by mouth once daily.      rosuvastatin (Crestor) 20 mg tablet Take 1 tablet (20 mg) by mouth once daily.      saccharomyces boulardii (Florastor) 250 mg capsule Take 1 capsule (250 mg) by mouth 2 times a day.      sennosides-docusate sodium (Rose-Colace) 8.6-50 mg tablet Take 2 tablets by mouth twice a day.      sertraline (Zoloft) 100 mg tablet Take 1 tablet (100 mg) by mouth once daily. 90 tablet 3    Toujeo SoloStar U-300 Insulin 300 unit/mL (1.5 mL) injection Inject 20 Units under the skin once daily at bedtime.      traZODone (Desyrel) 150 mg tablet TAKE TWO TABLETS BY MOUTH AT BEDTIME 90 tablet 2     No current facility-administered medications on file prior to visit.           Assessment and Plan     Yin Ojeda  is a 64 y.o. 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   -Suspected graft dysfunction due to  Lasix. This was evident when GFR improved to 60 after stopping daily Lasix, which has now been reduced again with daily use. I discussed with patient several times that she does not need that much of diuretics based on her exam. She would like to continue it for leg swelling.  Restarting Lasix PRN again with 90 day supply.       # Elevated blood pressure.  Current medication propranolol 20mg every day and on Lasix 40 mg as needed.  Will add amlodipine 5 mg and monitor blood pressure  -Will consider losartan for hypertension and graft GFR preservation    # Uncontrolled diabetes.  Follows with endocrinology.  Not a candidate for SGL 2 inhibitor due to increased risk of ketoacidosis with type 1 diabetes.    # Others      - Continue Klor-Con M20 when taking dose of Lasix   -Continue holding Calcium and Vitamin D supplements   -Continue Prolia, will consider restarting Ca in the future is levels fall and she remains getting Prolia shots. PTH is normal    Follow-up in 6 months with repeat blood work    Zack Hernandez MD, MS, QASIM MARQUEZ   Clinical  - [employer]   Nephrologist - [facility]

## 2025-02-19 ENCOUNTER — APPOINTMENT (OUTPATIENT)
Dept: NEPHROLOGY | Facility: CLINIC | Age: 65
End: 2025-02-19
Payer: MEDICARE

## 2025-03-01 LAB
ALBUMIN SERPL-MCNC: 4.1 G/DL (ref 3.6–5.1)
ALBUMIN/CREAT UR: NORMAL
BUN SERPL-MCNC: 14 MG/DL (ref 7–25)
BUN/CREAT SERPL: 11 (CALC) (ref 6–22)
CALCIUM SERPL-MCNC: 9.5 MG/DL (ref 8.6–10.4)
CHLORIDE SERPL-SCNC: 105 MMOL/L (ref 98–110)
CO2 SERPL-SCNC: 27 MMOL/L (ref 20–32)
CREAT SERPL-MCNC: 1.23 MG/DL (ref 0.5–1.05)
CREAT UR-MCNC: NORMAL MG/DL
EGFRCR SERPLBLD CKD-EPI 2021: 49 ML/MIN/1.73M2
ERYTHROCYTE [DISTWIDTH] IN BLOOD BY AUTOMATED COUNT: 14.2 % (ref 11–15)
FERRITIN SERPL-MCNC: 26 NG/ML (ref 16–288)
GLUCOSE SERPL-MCNC: 123 MG/DL (ref 65–99)
HCT VFR BLD AUTO: 41.1 % (ref 35–45)
HGB BLD-MCNC: 13 G/DL (ref 11.7–15.5)
IRON SATN MFR SERPL: 25 % (CALC) (ref 16–45)
IRON SERPL-MCNC: 70 MCG/DL (ref 45–160)
MCH RBC QN AUTO: 30 PG (ref 27–33)
MCHC RBC AUTO-ENTMCNC: 31.6 G/DL (ref 32–36)
MCV RBC AUTO: 94.7 FL (ref 80–100)
MICROALBUMIN UR-MCNC: NORMAL
PHOSPHATE SERPL-MCNC: 4.7 MG/DL (ref 2.1–4.3)
PLATELET # BLD AUTO: 136 THOUSAND/UL (ref 140–400)
PMV BLD REES-ECKER: 12.4 FL (ref 7.5–12.5)
POTASSIUM SERPL-SCNC: 4.1 MMOL/L (ref 3.5–5.3)
PTH-INTACT SERPL-MCNC: 79 PG/ML (ref 16–77)
RBC # BLD AUTO: 4.34 MILLION/UL (ref 3.8–5.1)
SODIUM SERPL-SCNC: 140 MMOL/L (ref 135–146)
TIBC SERPL-MCNC: 283 MCG/DL (CALC) (ref 250–450)
WBC # BLD AUTO: 8.3 THOUSAND/UL (ref 3.8–10.8)

## 2025-03-03 LAB
ALBUMIN SERPL-MCNC: 4.1 G/DL (ref 3.6–5.1)
ALBUMIN/CREAT UR: 501 MG/G CREAT
BUN SERPL-MCNC: 14 MG/DL (ref 7–25)
BUN/CREAT SERPL: 11 (CALC) (ref 6–22)
CALCIUM SERPL-MCNC: 9.5 MG/DL (ref 8.6–10.4)
CHLORIDE SERPL-SCNC: 105 MMOL/L (ref 98–110)
CO2 SERPL-SCNC: 27 MMOL/L (ref 20–32)
CREAT SERPL-MCNC: 1.23 MG/DL (ref 0.5–1.05)
CREAT UR-MCNC: 149 MG/DL (ref 20–275)
EGFRCR SERPLBLD CKD-EPI 2021: 49 ML/MIN/1.73M2
ERYTHROCYTE [DISTWIDTH] IN BLOOD BY AUTOMATED COUNT: 14.2 % (ref 11–15)
FERRITIN SERPL-MCNC: 26 NG/ML (ref 16–288)
GLUCOSE SERPL-MCNC: 123 MG/DL (ref 65–99)
HCT VFR BLD AUTO: 41.1 % (ref 35–45)
HGB BLD-MCNC: 13 G/DL (ref 11.7–15.5)
IRON SATN MFR SERPL: 25 % (CALC) (ref 16–45)
IRON SERPL-MCNC: 70 MCG/DL (ref 45–160)
MCH RBC QN AUTO: 30 PG (ref 27–33)
MCHC RBC AUTO-ENTMCNC: 31.6 G/DL (ref 32–36)
MCV RBC AUTO: 94.7 FL (ref 80–100)
MICROALBUMIN UR-MCNC: 74.6 MG/DL
PHOSPHATE SERPL-MCNC: 4.7 MG/DL (ref 2.1–4.3)
PLATELET # BLD AUTO: 136 THOUSAND/UL (ref 140–400)
PMV BLD REES-ECKER: 12.4 FL (ref 7.5–12.5)
POTASSIUM SERPL-SCNC: 4.1 MMOL/L (ref 3.5–5.3)
PTH-INTACT SERPL-MCNC: 79 PG/ML (ref 16–77)
RBC # BLD AUTO: 4.34 MILLION/UL (ref 3.8–5.1)
SODIUM SERPL-SCNC: 140 MMOL/L (ref 135–146)
TIBC SERPL-MCNC: 283 MCG/DL (CALC) (ref 250–450)
WBC # BLD AUTO: 8.3 THOUSAND/UL (ref 3.8–10.8)

## 2025-03-09 DIAGNOSIS — R60.0 LOCALIZED EDEMA: ICD-10-CM

## 2025-03-09 DIAGNOSIS — E10.10 TYPE 1 DIABETES MELLITUS WITH KETOACIDOSIS WITHOUT COMA: ICD-10-CM

## 2025-03-09 DIAGNOSIS — Z94.0 KIDNEY REPLACED BY TRANSPLANT (HHS-HCC): ICD-10-CM

## 2025-03-10 RX ORDER — FUROSEMIDE 40 MG/1
40 TABLET ORAL DAILY PRN
Qty: 30 TABLET | Refills: 1 | Status: SHIPPED | OUTPATIENT
Start: 2025-03-10

## 2025-03-12 DIAGNOSIS — N30.00 ACUTE CYSTITIS WITHOUT HEMATURIA: Primary | ICD-10-CM

## 2025-03-12 RX ORDER — CEFUROXIME AXETIL 250 MG/1
250 TABLET ORAL 2 TIMES DAILY
Qty: 14 TABLET | Refills: 0 | Status: SHIPPED | OUTPATIENT
Start: 2025-03-12 | End: 2025-03-19

## 2025-03-25 ENCOUNTER — TELEPHONE (OUTPATIENT)
Dept: PRIMARY CARE | Facility: CLINIC | Age: 65
End: 2025-03-25
Payer: MEDICARE

## 2025-03-25 DIAGNOSIS — N30.00 ACUTE CYSTITIS WITHOUT HEMATURIA: ICD-10-CM

## 2025-03-25 DIAGNOSIS — N39.0 URINARY TRACT INFECTION WITHOUT HEMATURIA, SITE UNSPECIFIED: Primary | ICD-10-CM

## 2025-03-25 RX ORDER — CEFUROXIME AXETIL 250 MG/1
250 TABLET ORAL 2 TIMES DAILY
Qty: 14 TABLET | Refills: 0 | OUTPATIENT
Start: 2025-03-25 | End: 2025-04-01

## 2025-03-25 NOTE — TELEPHONE ENCOUNTER
Patient states that the antibiotic that you prescribed for her UTI  her last week did not work she is still uncomfortable she wonders can you prescribe something stronger or another round of the same medication

## 2025-03-28 LAB
APPEARANCE UR: CLEAR
BACTERIA #/AREA URNS HPF: ABNORMAL /HPF
BACTERIA UR CULT: ABNORMAL
BILIRUB UR QL STRIP: NEGATIVE
COLOR UR: YELLOW
GLUCOSE UR QL STRIP: NEGATIVE
HGB UR QL STRIP: NEGATIVE
HYALINE CASTS #/AREA URNS LPF: ABNORMAL /LPF
KETONES UR QL STRIP: NEGATIVE
LEUKOCYTE ESTERASE UR QL STRIP: NEGATIVE
NITRITE UR QL STRIP: NEGATIVE
PH UR STRIP: 6 [PH] (ref 5–8)
PROT UR QL STRIP: ABNORMAL
RBC #/AREA URNS HPF: ABNORMAL /HPF
SERVICE CMNT-IMP: ABNORMAL
SP GR UR STRIP: 1.02 (ref 1–1.03)
SQUAMOUS #/AREA URNS HPF: ABNORMAL /HPF
WBC #/AREA URNS HPF: ABNORMAL /HPF

## 2025-04-02 DIAGNOSIS — F33.0 MAJOR DEPRESSIVE DISORDER, RECURRENT EPISODE, MILD DEGREE (CMS-HCC): ICD-10-CM

## 2025-04-02 RX ORDER — TRAZODONE HYDROCHLORIDE 150 MG/1
300 TABLET ORAL NIGHTLY
Qty: 90 TABLET | Refills: 2 | Status: SHIPPED | OUTPATIENT
Start: 2025-04-02

## 2025-04-22 DIAGNOSIS — I10 ESSENTIAL HYPERTENSION: ICD-10-CM

## 2025-04-22 DIAGNOSIS — T86.19 OTHER COMPLICATION OF KIDNEY TRANSPLANT: ICD-10-CM

## 2025-04-22 DIAGNOSIS — R60.0 LOCALIZED EDEMA: ICD-10-CM

## 2025-04-22 DIAGNOSIS — E10.10 TYPE 1 DIABETES MELLITUS WITH KETOACIDOSIS WITHOUT COMA: ICD-10-CM

## 2025-04-22 DIAGNOSIS — Z94.0 KIDNEY REPLACED BY TRANSPLANT (HHS-HCC): ICD-10-CM

## 2025-04-22 DIAGNOSIS — I10 ESSENTIAL HYPERTENSION: Primary | ICD-10-CM

## 2025-04-22 RX ORDER — AMLODIPINE BESYLATE 10 MG/1
10 TABLET ORAL DAILY
Qty: 90 TABLET | Refills: 3 | Status: CANCELLED | OUTPATIENT
Start: 2025-04-22 | End: 2026-04-22

## 2025-04-22 RX ORDER — LOSARTAN POTASSIUM 25 MG/1
12.5 TABLET ORAL DAILY
Qty: 90 TABLET | Refills: 3 | Status: SHIPPED | OUTPATIENT
Start: 2025-04-22 | End: 2026-04-22

## 2025-04-22 NOTE — PROGRESS NOTES
Patient's sent me blood pressure log-average reading 150-170/80-90.  She is currently on amlodipine 5 mg twice daily.  Will add losartan 12.5 mg.  Instructed to send me 2 weeks blood pressure log to review    Dr. Mary MCKEON

## 2025-04-23 DIAGNOSIS — T86.19 OTHER COMPLICATION OF KIDNEY TRANSPLANT: ICD-10-CM

## 2025-04-23 DIAGNOSIS — E10.10 TYPE 1 DIABETES MELLITUS WITH KETOACIDOSIS WITHOUT COMA: ICD-10-CM

## 2025-04-23 DIAGNOSIS — R60.0 LOCALIZED EDEMA: ICD-10-CM

## 2025-04-23 DIAGNOSIS — Z94.0 KIDNEY REPLACED BY TRANSPLANT (HHS-HCC): ICD-10-CM

## 2025-04-23 DIAGNOSIS — I10 ESSENTIAL HYPERTENSION: ICD-10-CM

## 2025-04-23 RX ORDER — AMLODIPINE BESYLATE 10 MG/1
10 TABLET ORAL DAILY
Qty: 90 TABLET | Refills: 3 | Status: SHIPPED | OUTPATIENT
Start: 2025-04-23 | End: 2026-04-23

## 2025-05-07 DIAGNOSIS — R60.0 LOCALIZED EDEMA: ICD-10-CM

## 2025-05-07 DIAGNOSIS — E10.10 TYPE 1 DIABETES MELLITUS WITH KETOACIDOSIS WITHOUT COMA: ICD-10-CM

## 2025-05-07 DIAGNOSIS — Z94.0 KIDNEY REPLACED BY TRANSPLANT (HHS-HCC): ICD-10-CM

## 2025-05-09 DIAGNOSIS — E10.10 TYPE 1 DIABETES MELLITUS WITH KETOACIDOSIS WITHOUT COMA: ICD-10-CM

## 2025-05-09 DIAGNOSIS — R60.0 LOCALIZED EDEMA: ICD-10-CM

## 2025-05-09 DIAGNOSIS — T86.19 OTHER COMPLICATION OF KIDNEY TRANSPLANT: ICD-10-CM

## 2025-05-09 DIAGNOSIS — Z94.0 KIDNEY REPLACED BY TRANSPLANT (HHS-HCC): ICD-10-CM

## 2025-05-09 RX ORDER — FUROSEMIDE 40 MG/1
40 TABLET ORAL DAILY PRN
Qty: 30 TABLET | Refills: 1 | Status: SHIPPED | OUTPATIENT
Start: 2025-05-09

## 2025-05-30 ENCOUNTER — TELEPHONE (OUTPATIENT)
Dept: NEPHROLOGY | Facility: CLINIC | Age: 65
End: 2025-05-30
Payer: MEDICARE

## 2025-05-30 NOTE — PROGRESS NOTES
Currently traveling  Yesterday 151/73, HR 78  Today 151/74,     Feet swollen, trouble getting shoes on  She will upload photos of swelling    Please advise  -MO

## 2025-06-16 DIAGNOSIS — I10 ESSENTIAL HYPERTENSION: ICD-10-CM

## 2025-06-21 DIAGNOSIS — F33.0 MAJOR DEPRESSIVE DISORDER, RECURRENT EPISODE, MILD DEGREE: ICD-10-CM

## 2025-06-23 RX ORDER — TRAZODONE HYDROCHLORIDE 150 MG/1
300 TABLET ORAL NIGHTLY
Qty: 90 TABLET | Refills: 2 | Status: SHIPPED | OUTPATIENT
Start: 2025-06-23

## 2025-06-24 ENCOUNTER — TELEPHONE (OUTPATIENT)
Dept: NEPHROLOGY | Facility: CLINIC | Age: 65
End: 2025-06-24
Payer: MEDICARE

## 2025-06-24 NOTE — PROGRESS NOTES
Patient called to report blood pressure readings:  113/65  165/80  152/74  159/81  188/82   190/81  147/77  175/86  The started on June 9.  Patient wants to know if there should be any medication adjustments for this, please advise?  -MO

## 2025-07-15 DIAGNOSIS — E10.10 TYPE 1 DIABETES MELLITUS WITH KETOACIDOSIS WITHOUT COMA: ICD-10-CM

## 2025-07-15 DIAGNOSIS — R60.0 LOCALIZED EDEMA: ICD-10-CM

## 2025-07-15 DIAGNOSIS — Z94.0 KIDNEY REPLACED BY TRANSPLANT (HHS-HCC): ICD-10-CM

## 2025-07-15 RX ORDER — FUROSEMIDE 40 MG/1
40 TABLET ORAL DAILY PRN
Qty: 30 TABLET | Refills: 1 | Status: SHIPPED | OUTPATIENT
Start: 2025-07-15

## 2025-07-16 NOTE — PROGRESS NOTES
Subjective   Patient ID: Yni Ojeda is a 65 y.o. female who presents for back and skin problem    HPI   The patient reports that since April of this year she has noted the presence of multiple brown spots on both legs and on both arms.  She also reports noticing more bruises on both arms.  She reports no other associated symptoms.    She also reports that since her last hip surgery years ago, she has experienced intermittent episodes of pain-unable to describe-in the lower back region extending to the proximal ends of the posterior thighs.  She reports that the episodes of been precipitated by the patient sitting too long, rising after having sat for an extended period of time, walking.  She also reports an increase in the intensity of the pain in the morning upon getting out of bed.  She does report no associated bilateral lower extremity weakness/paresthesias, preceding trauma/overexertion.  No other associated symptoms..  The patient has noted no change in the frequency or intensity of pain with use of Tylenol or application of topical pain relievers.  Review of Systems    Objective   There were no vitals taken for this visit.    Physical Exam  Skin-multiple areas of ecchymosis located on both arms.  Multiple brown macules located over both legs and over both arms.    Musculoskeletal  Lumbar spine-no erythema or swelling.  Decreased range of motion with pain noted on rotation bilaterally 15 degrees.  Full range of motion with no pain noted on flexion, extension, bending bilaterally.  Palpation did reveal tenderness at the medial end of the superior surface of the right buttock, no increase in warmth    Neurologic  Lower extremities:  Motor-strength 5/5 in all muscle groups tested  Sensory-light touch and pinprick sensation diminished distal to the right knee  Reflexes-0/4 bilaterally  Assessment/Plan   Problem List Items Addressed This Visit           ICD-10-CM    Type 1 diabetes mellitus - Primary E10.9     Relevant Medications    Toujeo SoloStar U-300 Insulin 300 unit/mL (1.5 mL) pen    Other Relevant Orders    Lipid Panel    Hemoglobin A1C    Bilateral lumbar radiculopathy M54.16    Relevant Medications    DULoxetine (Cymbalta) 20 mg DR capsule    Other Relevant Orders    Referral to Physical Therapy        Assessment  Presence of multiple brown macules located over both legs and over both arms-likely represent age spots  Multiple areas of ecchymosis noted on both arms-probably a side effect from administration of aspirin  Type 1 diabetes mellitus  Hyperlipidemia  Intermittent episodes of pain-unable to describe-in the lower back region extending to the proximal ends of the posterior thighs bilaterally-May be secondary to osteoarthritis and degenerative disc disease of the lumbar spine, bilateral lumbar radiculopathies secondary to central canal stenosis lumbar spine, bilateral neuroforaminal stenosis lumbar spine.  Osteoarthritis L1-L2, L2-L3  Degenerative disc disease L2-L3  Mild central canal stenosis L4-L5  Plan  Obtain fasting lipid profile and hemoglobin A1c as soon as possible.  Refer for physical therapy.  Refer to dermatologist as soon as possible.  I have recommended that the patient begin use of duloxetine 20 mg every morning.  The patient will contact me in 1 month with her condition or sooner if she develops side effects from administration of duloxetine

## 2025-07-17 ENCOUNTER — APPOINTMENT (OUTPATIENT)
Dept: PRIMARY CARE | Facility: CLINIC | Age: 65
End: 2025-07-17
Payer: MEDICARE

## 2025-07-17 VITALS
DIASTOLIC BLOOD PRESSURE: 84 MMHG | WEIGHT: 110.4 LBS | SYSTOLIC BLOOD PRESSURE: 114 MMHG | HEIGHT: 62 IN | HEART RATE: 80 BPM | BODY MASS INDEX: 20.32 KG/M2 | TEMPERATURE: 96.6 F

## 2025-07-17 DIAGNOSIS — E10.42 TYPE 1 DIABETES MELLITUS WITH DIABETIC POLYNEUROPATHY: Primary | ICD-10-CM

## 2025-07-17 DIAGNOSIS — M54.16 BILATERAL LUMBAR RADICULOPATHY: ICD-10-CM

## 2025-07-17 DIAGNOSIS — S32.008A CLOSED FRACTURE OF LUMBAR VERTEBRA WITH SPINAL CORD INJURY, INITIAL ENCOUNTER (MULTI): ICD-10-CM

## 2025-07-17 DIAGNOSIS — N18.31 CHRONIC KIDNEY DISEASE, STAGE 3A (MULTI): ICD-10-CM

## 2025-07-17 DIAGNOSIS — S34.109A CLOSED FRACTURE OF LUMBAR VERTEBRA WITH SPINAL CORD INJURY, INITIAL ENCOUNTER (MULTI): ICD-10-CM

## 2025-07-17 PROCEDURE — 3074F SYST BP LT 130 MM HG: CPT | Performed by: INTERNAL MEDICINE

## 2025-07-17 PROCEDURE — 3008F BODY MASS INDEX DOCD: CPT | Performed by: INTERNAL MEDICINE

## 2025-07-17 PROCEDURE — 1160F RVW MEDS BY RX/DR IN RCRD: CPT | Performed by: INTERNAL MEDICINE

## 2025-07-17 PROCEDURE — 3079F DIAST BP 80-89 MM HG: CPT | Performed by: INTERNAL MEDICINE

## 2025-07-17 PROCEDURE — 1159F MED LIST DOCD IN RCRD: CPT | Performed by: INTERNAL MEDICINE

## 2025-07-17 PROCEDURE — 4010F ACE/ARB THERAPY RXD/TAKEN: CPT | Performed by: INTERNAL MEDICINE

## 2025-07-17 PROCEDURE — 99214 OFFICE O/P EST MOD 30 MIN: CPT | Performed by: INTERNAL MEDICINE

## 2025-07-17 RX ORDER — INSULIN GLARGINE 300 U/ML
18-20 INJECTION, SOLUTION SUBCUTANEOUS NIGHTLY
Qty: 1.5 ML | Refills: 2 | Status: SHIPPED | OUTPATIENT
Start: 2025-07-17

## 2025-07-17 RX ORDER — DULOXETIN HYDROCHLORIDE 20 MG/1
20 CAPSULE, DELAYED RELEASE ORAL DAILY
Qty: 30 CAPSULE | Refills: 5 | Status: SHIPPED | OUTPATIENT
Start: 2025-07-17 | End: 2026-01-13

## 2025-07-29 DIAGNOSIS — I10 PRIMARY HYPERTENSION: ICD-10-CM

## 2025-07-29 RX ORDER — PROPRANOLOL HYDROCHLORIDE 10 MG/1
20 TABLET ORAL DAILY
Qty: 180 TABLET | Refills: 2 | Status: SHIPPED | OUTPATIENT
Start: 2025-07-29

## 2025-08-01 DIAGNOSIS — T86.19 OTHER COMPLICATION OF KIDNEY TRANSPLANT: ICD-10-CM

## 2025-08-01 DIAGNOSIS — R60.0 LOCALIZED EDEMA: ICD-10-CM

## 2025-08-01 DIAGNOSIS — E10.10 TYPE 1 DIABETES MELLITUS WITH KETOACIDOSIS WITHOUT COMA: ICD-10-CM

## 2025-08-01 DIAGNOSIS — Z94.0 KIDNEY REPLACED BY TRANSPLANT (HHS-HCC): ICD-10-CM

## 2025-08-13 DIAGNOSIS — F33.0 MAJOR DEPRESSIVE DISORDER, RECURRENT EPISODE, MILD DEGREE: ICD-10-CM

## 2025-08-13 RX ORDER — SERTRALINE HYDROCHLORIDE 100 MG/1
100 TABLET, FILM COATED ORAL DAILY
Qty: 90 TABLET | Refills: 3 | Status: SHIPPED | OUTPATIENT
Start: 2025-08-13

## 2025-08-28 ENCOUNTER — EVALUATION (OUTPATIENT)
Dept: PHYSICAL THERAPY | Facility: CLINIC | Age: 65
End: 2025-08-28
Payer: MEDICARE

## 2025-08-28 DIAGNOSIS — M25.552 BILATERAL HIP PAIN: ICD-10-CM

## 2025-08-28 DIAGNOSIS — M54.42 LOW BACK PAIN WITH BILATERAL SCIATICA: Primary | ICD-10-CM

## 2025-08-28 DIAGNOSIS — M25.551 BILATERAL HIP PAIN: ICD-10-CM

## 2025-08-28 DIAGNOSIS — M54.41 LOW BACK PAIN WITH BILATERAL SCIATICA: Primary | ICD-10-CM

## 2025-08-28 DIAGNOSIS — M25.60 STIFFNESS IN JOINT: ICD-10-CM

## 2025-08-28 PROCEDURE — 97161 PT EVAL LOW COMPLEX 20 MIN: CPT | Mod: GP | Performed by: PHYSICAL THERAPIST

## 2025-08-28 PROCEDURE — 97110 THERAPEUTIC EXERCISES: CPT | Mod: GP | Performed by: PHYSICAL THERAPIST

## 2025-09-04 ENCOUNTER — TREATMENT (OUTPATIENT)
Dept: PHYSICAL THERAPY | Facility: CLINIC | Age: 65
End: 2025-09-04
Payer: MEDICARE

## 2025-09-04 DIAGNOSIS — M54.42 LOW BACK PAIN WITH BILATERAL SCIATICA: Primary | ICD-10-CM

## 2025-09-04 DIAGNOSIS — M54.41 LOW BACK PAIN WITH BILATERAL SCIATICA: Primary | ICD-10-CM

## 2025-09-04 DIAGNOSIS — M25.60 STIFFNESS IN JOINT: ICD-10-CM

## 2025-09-04 DIAGNOSIS — M25.551 BILATERAL HIP PAIN: ICD-10-CM

## 2025-09-04 DIAGNOSIS — M25.552 BILATERAL HIP PAIN: ICD-10-CM

## 2025-09-04 PROCEDURE — 97110 THERAPEUTIC EXERCISES: CPT | Mod: GP | Performed by: PHYSICAL THERAPIST

## 2025-09-05 ENCOUNTER — LAB (OUTPATIENT)
Dept: LAB | Facility: HOSPITAL | Age: 65
End: 2025-09-05
Payer: MEDICARE

## 2025-09-05 DIAGNOSIS — N30.00 ACUTE CYSTITIS WITHOUT HEMATURIA: ICD-10-CM

## 2025-09-05 LAB
ALBUMIN SERPL BCP-MCNC: 3.9 G/DL (ref 3.4–5)
ANION GAP SERPL CALC-SCNC: 11 MMOL/L (ref 10–20)
APPEARANCE UR: CLEAR
BILIRUB UR STRIP.AUTO-MCNC: NEGATIVE MG/DL
BUN SERPL-MCNC: 17 MG/DL (ref 6–23)
CALCIUM SERPL-MCNC: 9.8 MG/DL (ref 8.6–10.3)
CHLORIDE SERPL-SCNC: 103 MMOL/L (ref 98–107)
CHOLEST SERPL-MCNC: 262 MG/DL
CHOLEST/HDLC SERPL: 4.2 (CALC)
CO2 SERPL-SCNC: 30 MMOL/L (ref 21–32)
COLOR UR: ABNORMAL
CREAT SERPL-MCNC: 1.15 MG/DL (ref 0.5–1.05)
EGFRCR SERPLBLD CKD-EPI 2021: 53 ML/MIN/1.73M*2
ERYTHROCYTE [DISTWIDTH] IN BLOOD BY AUTOMATED COUNT: 14.4 % (ref 11.5–14.5)
GLUCOSE SERPL-MCNC: 231 MG/DL (ref 74–99)
GLUCOSE UR STRIP.AUTO-MCNC: ABNORMAL MG/DL
HCT VFR BLD AUTO: 38.1 % (ref 36–46)
HDLC SERPL-MCNC: 62 MG/DL
HGB BLD-MCNC: 12.3 G/DL (ref 12–16)
KETONES UR STRIP.AUTO-MCNC: NEGATIVE MG/DL
LDLC SERPL CALC-MCNC: 173 MG/DL (CALC)
LEUKOCYTE ESTERASE UR QL STRIP.AUTO: ABNORMAL
MCH RBC QN AUTO: 30.4 PG (ref 26–34)
MCHC RBC AUTO-ENTMCNC: 32.3 G/DL (ref 32–36)
MCV RBC AUTO: 94 FL (ref 80–100)
NITRITE UR QL STRIP.AUTO: NEGATIVE
NONHDLC SERPL-MCNC: 200 MG/DL (CALC)
NRBC BLD-RTO: 0 /100 WBCS (ref 0–0)
PH UR STRIP.AUTO: 6 [PH]
PHOSPHATE SERPL-MCNC: 3.8 MG/DL (ref 2.5–4.9)
PLATELET # BLD AUTO: 144 X10*3/UL (ref 150–450)
POTASSIUM SERPL-SCNC: 4.7 MMOL/L (ref 3.5–5.3)
PROT UR STRIP.AUTO-MCNC: ABNORMAL MG/DL
RBC # BLD AUTO: 4.04 X10*6/UL (ref 4–5.2)
RBC # UR STRIP.AUTO: NEGATIVE MG/DL
RBC #/AREA URNS AUTO: NORMAL /HPF
SODIUM SERPL-SCNC: 139 MMOL/L (ref 136–145)
SP GR UR STRIP.AUTO: 1.01
TRIGL SERPL-MCNC: 134 MG/DL
UROBILINOGEN UR STRIP.AUTO-MCNC: NORMAL MG/DL
WBC # BLD AUTO: 8.5 X10*3/UL (ref 4.4–11.3)
WBC #/AREA URNS AUTO: NORMAL /HPF

## 2025-09-05 PROCEDURE — 82043 UR ALBUMIN QUANTITATIVE: CPT

## 2025-09-05 PROCEDURE — 85027 COMPLETE CBC AUTOMATED: CPT

## 2025-09-05 PROCEDURE — 80069 RENAL FUNCTION PANEL: CPT

## 2025-09-05 PROCEDURE — 82570 ASSAY OF URINE CREATININE: CPT

## 2025-09-05 PROCEDURE — 87086 URINE CULTURE/COLONY COUNT: CPT

## 2025-09-05 PROCEDURE — 81001 URINALYSIS AUTO W/SCOPE: CPT

## 2025-09-06 LAB
CREAT UR-MCNC: 81.2 MG/DL (ref 20–320)
EST. AVERAGE GLUCOSE BLD GHB EST-MCNC: 183 MG/DL
EST. AVERAGE GLUCOSE BLD GHB EST-SCNC: 10.1 MMOL/L
HBA1C MFR BLD: 8 %
MICROALBUMIN UR-MCNC: 289.8 MG/L
MICROALBUMIN/CREAT UR: 356.9 UG/MG CREAT

## 2025-09-07 DIAGNOSIS — E10.42 TYPE 1 DIABETES MELLITUS WITH DIABETIC POLYNEUROPATHY: Primary | ICD-10-CM

## 2025-09-07 LAB — BACTERIA UR CULT: NORMAL

## 2025-09-07 RX ORDER — ROSUVASTATIN CALCIUM 20 MG/1
20 TABLET, COATED ORAL DAILY
Qty: 90 TABLET | Refills: 2 | Status: SHIPPED | OUTPATIENT
Start: 2025-09-07

## 2025-09-09 ENCOUNTER — APPOINTMENT (OUTPATIENT)
Dept: NEPHROLOGY | Facility: CLINIC | Age: 65
End: 2025-09-09
Payer: MEDICARE